# Patient Record
Sex: FEMALE | Race: WHITE | NOT HISPANIC OR LATINO | Employment: UNEMPLOYED | URBAN - METROPOLITAN AREA
[De-identification: names, ages, dates, MRNs, and addresses within clinical notes are randomized per-mention and may not be internally consistent; named-entity substitution may affect disease eponyms.]

---

## 2023-10-09 LAB
CREATININE (EXTERNAL): 0.68 MG/DL (ref 0.52–1.04)
GFR ESTIMATED (EXTERNAL): >60 ML/MIN/1.73M2
GLUCOSE (EXTERNAL): 147 MG/DL (ref 74–100)
POTASSIUM (EXTERNAL): 3.7 MMOL/L (ref 3.4–5.1)

## 2023-10-23 ENCOUNTER — TRANSFERRED RECORDS (OUTPATIENT)
Dept: HEALTH INFORMATION MANAGEMENT | Facility: CLINIC | Age: 38
End: 2023-10-23

## 2023-11-28 ENCOUNTER — TRANSFERRED RECORDS (OUTPATIENT)
Dept: HEALTH INFORMATION MANAGEMENT | Facility: CLINIC | Age: 38
End: 2023-11-28

## 2023-11-30 ENCOUNTER — MEDICAL CORRESPONDENCE (OUTPATIENT)
Dept: HEALTH INFORMATION MANAGEMENT | Facility: CLINIC | Age: 38
End: 2023-11-30

## 2023-12-05 ENCOUNTER — PRE VISIT (OUTPATIENT)
Dept: ONCOLOGY | Facility: CLINIC | Age: 38
End: 2023-12-05
Payer: COMMERCIAL

## 2023-12-05 ENCOUNTER — MEDICAL CORRESPONDENCE (OUTPATIENT)
Dept: HEALTH INFORMATION MANAGEMENT | Facility: CLINIC | Age: 38
End: 2023-12-05
Payer: COMMERCIAL

## 2023-12-05 ENCOUNTER — TRANSCRIBE ORDERS (OUTPATIENT)
Dept: OTHER | Age: 38
End: 2023-12-05

## 2023-12-05 ENCOUNTER — PATIENT OUTREACH (OUTPATIENT)
Dept: ONCOLOGY | Facility: CLINIC | Age: 38
End: 2023-12-05
Payer: COMMERCIAL

## 2023-12-05 DIAGNOSIS — R19.00 RETROPERITONEAL MASS: Primary | ICD-10-CM

## 2023-12-05 NOTE — TELEPHONE ENCOUNTER
"RECORDS STATUS - ALL OTHER DIAGNOSIS      RECORDS RECEIVED FROM: [facility]    Appt Date: TBD NN WQ    Action Taken  Date/Description  SANDHYA     N Navigator December 7, 2023  11:39 AM   Call to Pt to follow-up on records at 's.  She answered the phone by saying \"STOP F'ING CALLING ME\"  and hung up.  Marking complete - Message to NN    1:52 PM  Call from Evangelina to jazmyne request records for Pt    December 11, 2023 2:43 PM  VM from Pt's mom Lidia, stating she is the guardian and all calls should go to her and not the Pt.  I updated her to the primary number as she was listed secondary.     Action December 8, 2023 3:12 PM SANDHYA   Incoming Records Records received from [facility] and sent to [facility] for upload. Emailed to NN Team     Imaging Received  December 8, 2023  3:18 PM  SANDHYA   Action: Images from [facility] received and resolved to PACS.     Records Requested     Clinic name Comments/Action Taken   [facility] December 7, 2023 1:53 PM    Faxed request for all records   [facility] December 7, 2023 1:53 PM    Faxed request for all imaging     Action    Action Taken 12/5/2023 2:15pm KEB   I called pt Erlinda - I need her verbal consent to pull [facility]  records into CE. Her phone went to . I called again - unavailable.     12/6/2023 7:57AM KEB   I called pt Erlinda - unavailable. I left a vm requesting a call back.       NOTES STATUS DETAILS   OFFICE NOTE from referring provider Mark Arellano at [facility]    OFFICE NOTE from medical oncologist Left a vm for pt on 12/6/2023 around 8am - need verbal consent to pull her  records into CE.     DISCHARGE SUMMARY from hospital     DISCHARGE REPORT from the ER     OPERATIVE REPORT     MEDICATION LIST     CLINICAL TRIAL TREATMENTS TO DATE     LABS     PATHOLOGY REPORTS     ANYTHING RELATED TO DIAGNOSIS     GENONOMIC TESTING     TYPE:     IMAGING (NEED IMAGES & REPORT)     CT SCANS     MRI     MAMMO   "   ULTRASOUND     PET

## 2023-12-05 NOTE — PROGRESS NOTES
New Patient Hematology / Oncology Nurse Navigator Note     Referral Date: 12/5/23    Referring provider: Agus Arellano    Referring Clinic/Organization: Other - [facility]      Referred to: Surgical Oncology    Requested provider (if applicable): Dr. Cope    Evaluation for : retroperitoneal mass     Referral flagged for records needed and will have CSS team gather:    Imaging - pushed to [facility] and linked to patient chart  Clinic Notes - anything pertaining to this mass or with Dr Arellano  Labs - any labs done

## 2023-12-07 NOTE — PROGRESS NOTES
Writer placed call to referring clinic to advise that referral will be closed as patient has been nonresponsive to writer and requested to not be contacted again to the CSS member attempting to gather records. Writer had to leave Vm on surgical nurse line, contact information provided for further questions.

## 2023-12-07 NOTE — PROGRESS NOTES
Writer placed call to Erlinda to confirm that she is not interested in pursuing workup for lipoma/mass found on imaging, referral from Dr Arellano to Dr Cope. Per encounter from CSS team when attempting to gather records, she answered the phone and asked to not be contacted anymore. Writer placed call to ensure she is not interested but after she answered, she became nonresponsive to attempts to confirm her identity. Writer could hear active noise in the background but she would not engage in conversation. Call ended.

## 2023-12-08 ENCOUNTER — TRANSCRIBE ORDERS (OUTPATIENT)
Dept: OTHER | Age: 38
End: 2023-12-08

## 2023-12-08 DIAGNOSIS — K31.89 MASS OF DUODENUM: Primary | ICD-10-CM

## 2024-01-05 ENCOUNTER — PATIENT OUTREACH (OUTPATIENT)
Dept: ONCOLOGY | Facility: CLINIC | Age: 39
End: 2024-01-05

## 2024-01-05 ENCOUNTER — ONCOLOGY VISIT (OUTPATIENT)
Dept: ONCOLOGY | Facility: CLINIC | Age: 39
End: 2024-01-05
Attending: SURGERY
Payer: COMMERCIAL

## 2024-01-05 VITALS
DIASTOLIC BLOOD PRESSURE: 78 MMHG | TEMPERATURE: 97.7 F | OXYGEN SATURATION: 96 % | RESPIRATION RATE: 16 BRPM | HEART RATE: 89 BPM | BODY MASS INDEX: 30.54 KG/M2 | WEIGHT: 151.5 LBS | HEIGHT: 59 IN | SYSTOLIC BLOOD PRESSURE: 120 MMHG

## 2024-01-05 DIAGNOSIS — R19.00 RETROPERITONEAL MASS: Primary | ICD-10-CM

## 2024-01-05 PROCEDURE — G0463 HOSPITAL OUTPT CLINIC VISIT: HCPCS | Performed by: SURGERY

## 2024-01-05 PROCEDURE — 99203 OFFICE O/P NEW LOW 30 MIN: CPT | Performed by: SURGERY

## 2024-01-05 RX ORDER — POTASSIUM CHLORIDE 1500 MG/1
20 TABLET, EXTENDED RELEASE ORAL 2 TIMES DAILY
COMMUNITY
Start: 2022-06-27

## 2024-01-05 RX ORDER — FERROUS SULFATE 325(65) MG
1 TABLET ORAL DAILY
COMMUNITY
Start: 2022-06-06

## 2024-01-05 RX ORDER — SERTRALINE HYDROCHLORIDE 25 MG/1
1 TABLET, FILM COATED ORAL EVERY EVENING
COMMUNITY
Start: 2023-10-27

## 2024-01-05 RX ORDER — FOLIC ACID 1 MG/1
1 TABLET ORAL DAILY
COMMUNITY
Start: 2023-10-27

## 2024-01-05 RX ORDER — FUROSEMIDE 20 MG
1 TABLET ORAL DAILY
Status: ON HOLD | COMMUNITY
End: 2024-02-14

## 2024-01-05 RX ORDER — LEVETIRACETAM 500 MG/1
4 TABLET ORAL 2 TIMES DAILY
COMMUNITY
Start: 2022-06-21

## 2024-01-05 RX ORDER — CHOLECALCIFEROL (VITAMIN D3) 50 MCG
1 TABLET ORAL DAILY
COMMUNITY
Start: 2023-10-27

## 2024-01-05 RX ORDER — CLINDAMYCIN PHOSPHATE 11.9 MG/ML
5 SOLUTION TOPICAL PRN
COMMUNITY
Start: 2022-10-05

## 2024-01-05 RX ORDER — LACOSAMIDE 100 MG/1
100 TABLET ORAL 2 TIMES DAILY
COMMUNITY
Start: 2023-11-30

## 2024-01-05 RX ORDER — METOPROLOL TARTRATE 25 MG/1
1 TABLET, FILM COATED ORAL 2 TIMES DAILY
COMMUNITY
Start: 2022-06-21

## 2024-01-05 RX ORDER — SODIUM CHLORIDE 1 G/1
1 TABLET ORAL 2 TIMES DAILY
COMMUNITY
Start: 2023-06-15

## 2024-01-05 RX ORDER — TORSEMIDE 5 MG/1
1 TABLET ORAL 2 TIMES DAILY
COMMUNITY
Start: 2023-09-19

## 2024-01-05 RX ORDER — CETIRIZINE HYDROCHLORIDE 10 MG/1
1 TABLET ORAL EVERY EVENING
COMMUNITY
Start: 2023-10-27

## 2024-01-05 RX ORDER — CLONAZEPAM 0.5 MG/1
0.5 TABLET ORAL 2 TIMES DAILY PRN
COMMUNITY
Start: 2022-05-18

## 2024-01-05 ASSESSMENT — PAIN SCALES - GENERAL: PAINLEVEL: NO PAIN (0)

## 2024-01-05 NOTE — NURSING NOTE
"Oncology Rooming Note    January 5, 2024 11:00 AM   Erlinda Reyes is a 38 year old female who presents for:    Chief Complaint   Patient presents with    Oncology Clinic Visit     Retroperitoneal mass     Initial Vitals: /78 (BP Location: Left arm, Patient Position: Sitting, Cuff Size: Adult Regular)   Pulse 89   Temp 97.7  F (36.5  C) (Oral)   Resp 16   Ht 1.505 m (4' 11.25\")   Wt 68.7 kg (151 lb 8 oz)   LMP  (LMP Unknown)   SpO2 96%   BMI 30.34 kg/m   Estimated body mass index is 30.34 kg/m  as calculated from the following:    Height as of this encounter: 1.505 m (4' 11.25\").    Weight as of this encounter: 68.7 kg (151 lb 8 oz). Body surface area is 1.69 meters squared.  No Pain (0) Comment: Data Unavailable   No LMP recorded (lmp unknown).  Allergies reviewed: Yes  Medications reviewed: Yes    Medications: Medication refills not needed today.  Pharmacy name entered into [facility]: [facility] - [place], [place] - [address] [place] [address] [address]    Frailty Screening:   Is the patient here for a new oncology consult visit in cancer care? 2. No      Clinical concerns: none       Colette Goldstein              "

## 2024-01-05 NOTE — PROGRESS NOTES
Patient is a 38-year-old woman with a new mass in her retroperitoneum.  She recently had a CT scan in September for flank pain.  A new lipomatous mass was identified in the retroperitoneum near the duodenum the mass measured approximately 2.6 cm in size and was well defined.  She had a CT scan on January 16, 2022 and I do not see the mass at that time.  She is asymptomatic from this mass.  Her past medical history significant for brain tumor that was removed when she was 2.  She has SIADH.  She has had a history of a cholecystectomy and foot surgery.    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  However a well-differentiated liposarcoma cannot be excluded.  Furthermore cannot be excluded with a needle biopsy.  The patient her mother would like to have this removed.  I talked about the approach.  I would do this is an open procedure.  She would likely be in the hospital for a day or 2 after surgery.  We would test the tumor to make sure she does not this is not malignant.  All of her questions were asked and answered.    Total time was 30 minutes which included reviewing her imaging in person visit and coordinating her care

## 2024-01-05 NOTE — LETTER
1/5/2024         RE: Erlinda Reyes  [address] [facility] [address]        Dear Colleague,    Thank you for referring your patient, Erlinda Reyes, to the [facility]. Please see a copy of my visit note below.    Patient is a 38-year-old woman with a new mass in her retroperitoneum.  She recently had a CT scan in September for flank pain.  A new lipomatous mass was identified in the retroperitoneum near the duodenum the mass measured approximately 2.6 cm in size and was well defined.  She had a CT scan on January 16, 2022 and I do not see the mass at that time.  She is asymptomatic from this mass.  Her past medical history significant for brain tumor that was removed when she was 2.  She has SIADH.  She has had a history of a cholecystectomy and foot surgery.    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  However a well-differentiated liposarcoma cannot be excluded.  Furthermore cannot be excluded with a needle biopsy.  The patient her mother would like to have this removed.  I talked about the approach.  I would do this is an open procedure.  She would likely be in the hospital for a day or 2 after surgery.  We would test the tumor to make sure she does not this is not malignant.  All of her questions were asked and answered.    Total time was 30 minutes which included reviewing her imaging in person visit and coordinating her care      Sincerely,        Agus Cope MD

## 2024-01-08 ENCOUNTER — TELEPHONE (OUTPATIENT)
Dept: ONCOLOGY | Facility: CLINIC | Age: 39
End: 2024-01-08
Payer: COMMERCIAL

## 2024-01-08 NOTE — TELEPHONE ENCOUNTER
Called and spoke with the patient to schedule surgery. Surgery scheduled for 2/14 at [facility] with Dr. Cope. Offered earlier date but patient is coming down with a cold and they want to delay to give time to recover from cold.    H&P with PCP Dr. Perez within 30 days of the surgery date. Patient verbalized understanding H&P is needed prior to surgery.    Post-op scheduled for 3/8 with Dr. Cope in [place].    Writer will mail surgery packet.    No further questions/concerns at this time.    Marianne Garcia on 1/8/2024 at 9:19 AM

## 2024-02-12 ENCOUNTER — ANESTHESIA EVENT (OUTPATIENT)
Dept: SURGERY | Facility: CLINIC | Age: 39
DRG: 336 | End: 2024-02-12
Payer: COMMERCIAL

## 2024-02-12 ENCOUNTER — DOCUMENTATION ONLY (OUTPATIENT)
Dept: OTHER | Facility: CLINIC | Age: 39
End: 2024-02-12
Payer: COMMERCIAL

## 2024-02-12 NOTE — OR NURSING
Surgery 2/14. Platelet count 86. SIADH.  Received: Today  Mackenzie Barbosa, Agus Doan MD; [facility]; Genoveva Wells MD  Cc: Tiffany Gustafson RN  Hello,    Hilario is scheduled for retroperitoneal tumor removal on 2/14/24    Her pre-op H&P notes she has history of Increased risk of metabolic disturbance due to SIADH.  H/O difficult airway, IV access challenging due to SVC syndrome, static neuro deficit (R hemiplegia from prior meningioma excision).    Pt's Platelet count was 86 on 2/8/24.    Kind regards,    Mackenzie Barbosa RN, BSN  Pre-Anesthesia Screening  [phone] Office

## 2024-02-13 NOTE — OR NURSING
"RE: Surgery 2/14. Platelet count 86. SIADH.  Received: Today  Jay Valenzuela MD Sun, Lisa Yang, MD; Mackenzie Barbosa, RN; Agus Cope MD; [facility]  Cc: Tiffany Gustafson, RN  Thanks Genoveva!          Previous Messages       ----- Message -----  From: Genoveva Wells MD  Sent: 2/12/2024   4:45 PM CST  To: Agus Cope MD; Mackenzie Barbosa RN; *  Subject: RE: Surgery 2/14. Platelet count 86. SIADH.      Thanks, Mackenzie. I'm including Dr. Valenzuela, who is AIC on 2/14.    Jay, this is what I can see in the chart with regards to difficult airway history, from 2021 airway note: \"Easy mask Ventilation. Patient has small mouth opening <2 FB. Glidescope intubation selected. Glottis visualized upon glidescope insertion with small glottic opening at an anterior angle. Glidescope stylet attempted and failed to pass through vocal cords. Bougie utilized to successfully intubate trachea followed by atraumatic insertion of ETT over bougie. Would recommend glidescope and bougie for further intubation attempts\"      ----- Message -----  From: Mackenzie Barbosa RN  Sent: 2/12/2024   4:29 PM CST  To: Agus Cope MD; Tiffany Gustafson RN; *  Subject: Surgery 2/14. Platelet count 86. SIADH.          Hello,    Pt is scheduled for retroperitoneal tumor removal on 2/14/24    Her pre-op H&P notes she has history of Increased risk of metabolic disturbance due to SIADH.  H/O difficult airway, IV access challenging due to SVC syndrome, static neuro deficit (R hemiplegia from prior meningioma excision).    Pt's Platelet count was 86 on 2/8/24.    Kind regards,    Mackenzie Barbosa RN, BSN  Pre-Anesthesia Screening  [phone] Office        "

## 2024-02-13 NOTE — ANESTHESIA PREPROCEDURE EVALUATION
Anesthesia Pre-Procedure Evaluation    Patient: Erlinda Reyes   MRN: [id number] : 1985        Procedure : Procedure(s):  retroperitoneal tumor removal          Past Medical History:   Diagnosis Date    Difficult intubation       History reviewed. No pertinent surgical history.   Allergies   Allergen Reactions    Adhesive Tape Rash    Cefaclor Rash     Reaction Date: 2008; Annotation - [id number]: CECLOR          Reaction Date: 2008; Annotation - [id number]: CECLOR    Erythromycin Other (See Comments), Rash and Unknown      Social History     Tobacco Use    Smoking status: Never    Smokeless tobacco: Never   Substance Use Topics    Alcohol use: Not Currently      Wt Readings from Last 1 Encounters:   24 67.4 kg (148 lb 9.4 oz)        Anesthesia Evaluation   Pt has had prior anesthetic.     History of anesthetic complications  - difficult airway.      ROS/MED HX  ENT/Pulmonary:  - neg pulmonary ROS     Neurologic: Comment: SIADH, static neuro deficit (R hemiplegia from prior meningioma excision).  Meningioma  Epilepsy on Keppra      Cardiovascular: Comment: SVC syndrome    (+)  hypertension- -   -  - -                                 Previous cardiac testing   Echo: Date: 2013 Results:  Final Impression:   1. Normal estimated left ventricular ejection fraction is 55-60% with   normal wall motion.   2. Sinus rhythm.   3. Right ventricular contraction is normal.   4. Mitral insufficiency trace.   5. Tricuspid insufficiency mild. Estimated PA pressure normal at 26 plus   right atrial pressure.     Stress Test:  Date: Results:    ECG Reviewed:  Date: Results:    Cath:  Date: Results:      METS/Exercise Tolerance:     Hematologic: Comments: Thrombocytopenia 114      Musculoskeletal:  - neg musculoskeletal ROS     GI/Hepatic: Comment: peritoneal tumor - which appears to be a lipoma    (+) GERD,                   Renal/Genitourinary:  - neg Renal ROS     Endo:  - neg endo ROS    "  Psychiatric/Substance Use:     (+) psychiatric history anxiety       Infectious Disease:  - neg infectious disease ROS     Malignancy:   (+) Malignancy,     Other:  - neg other ROS          Physical Exam    Airway        Mallampati: IV   TM distance: > 3 FB   Neck ROM: full   Mouth opening: < 3 cm    Respiratory Devices and Support         Dental       (+) Minor Abnormalities - some fillings, tiny chips      Cardiovascular   cardiovascular exam normal          Pulmonary   pulmonary exam normal                OUTSIDE LABS:  CBC: No results found for: \"WBC\", \"HGB\", \"HCT\", \"PLT\"  BMP:   Lab Results   Component Value Date    [facility] 91 02/14/2024     COAGS: No results found for: \"PTT\", \"INR\", \"FIBR\"  POC: No results found for: \"BGM\", \"HCG\", \"HCGS\"  HEPATIC: No results found for: \"ALBUMIN\", \"PROTTOTAL\", \"ALT\", \"AST\", \"GGT\", \"ALKPHOS\", \"BILITOTAL\", \"BILIDIRECT\", \"NISSA\"  OTHER: No results found for: \"PH\", \"LACT\", \"A1C\", \"TOMMY\", \"PHOS\", \"MAG\", \"LIPASE\", \"AMYLASE\", \"TSH\", \"T4\", \"T3\", \"CRP\", \"SED\"    Anesthesia Plan    ASA Status:  3    NPO Status:  NPO Appropriate    Anesthesia Type: General.     - Airway: ETT   Induction: Propofol, Intravenous.   Maintenance: Balanced.   Techniques and Equipment:     - Airway: Shoulder Mohsen/Ramp, Fiberoptic Bronchoscope, Video-Laryngoscope     - Lines/Monitors: 2nd IV, BIS     Consents    Anesthesia Plan(s) and associated risks, benefits, and realistic alternatives discussed. Questions answered and patient/representative(s) expressed understanding.     - Discussed: Risks, Benefits and Alternatives for BOTH SEDATION and the PROCEDURE were discussed     - Discussed with:  Patient, Parent (Mother and/or Father)      - Extended Intubation/Ventilatory Support Discussed: Yes.      - Patient is DNR/DNI Status: No     Use of blood products discussed: Yes.     - Discussed with: Patient.     - Consented: consented to blood products     Postoperative Care    Pain management: IV analgesics, Oral pain " "medications, Multi-modal analgesia.   PONV prophylaxis: Ondansetron (or other 5HT-3), Dexamethasone or Solumedrol     Comments:               Axel Rivers MD    I have reviewed the pertinent notes and labs in the chart from the past 30 days and (re)examined the patient.  Any updates or changes from those notes are reflected in this note.              # Obesity: Estimated body mass index is 31.06 kg/m  as calculated from the following:    Height as of this encounter: 1.473 m (4' 10\").    Weight as of this encounter: 67.4 kg (148 lb 9.4 oz).      "

## 2024-02-14 ENCOUNTER — ANESTHESIA (OUTPATIENT)
Dept: SURGERY | Facility: CLINIC | Age: 39
DRG: 336 | End: 2024-02-14
Payer: COMMERCIAL

## 2024-02-14 ENCOUNTER — HOSPITAL ENCOUNTER (INPATIENT)
Facility: CLINIC | Age: 39
LOS: 5 days | Discharge: HOME | DRG: 336 | End: 2024-02-19
Attending: SURGERY | Admitting: SURGERY
Payer: COMMERCIAL

## 2024-02-14 DIAGNOSIS — G89.18 POSTOPERATIVE PAIN: Primary | ICD-10-CM

## 2024-02-14 LAB
ANION GAP SERPL CALCULATED.3IONS-SCNC: 11 MMOL/L (ref 7–15)
ANION GAP SERPL CALCULATED.3IONS-SCNC: 12 MMOL/L (ref 7–15)
BUN SERPL-MCNC: 14.5 MG/DL (ref 6–20)
BUN SERPL-MCNC: 14.8 MG/DL (ref 6–20)
CALCIUM SERPL-MCNC: 7.9 MG/DL (ref 8.6–10)
CALCIUM SERPL-MCNC: 8.1 MG/DL (ref 8.6–10)
CHLORIDE SERPL-SCNC: 104 MMOL/L (ref 98–107)
CHLORIDE SERPL-SCNC: 106 MMOL/L (ref 98–107)
CREAT SERPL-MCNC: 0.6 MG/DL (ref 0.51–0.95)
CREAT SERPL-MCNC: 0.66 MG/DL (ref 0.51–0.95)
DEPRECATED HCO3 PLAS-SCNC: 21 MMOL/L (ref 22–29)
DEPRECATED HCO3 PLAS-SCNC: 23 MMOL/L (ref 22–29)
EGFRCR SERPLBLD CKD-EPI 2021: >90 ML/MIN/1.73M2
EGFRCR SERPLBLD CKD-EPI 2021: >90 ML/MIN/1.73M2
ERYTHROCYTE [DISTWIDTH] IN BLOOD BY AUTOMATED COUNT: 12.3 % (ref 10–15)
GLUCOSE BLDC GLUCOMTR-MCNC: 91 MG/DL (ref 70–99)
GLUCOSE SERPL-MCNC: 125 MG/DL (ref 70–99)
GLUCOSE SERPL-MCNC: 127 MG/DL (ref 70–99)
HCT VFR BLD AUTO: 34 % (ref 35–47)
HGB BLD-MCNC: 10.9 G/DL (ref 11.7–15.7)
MAGNESIUM SERPL-MCNC: 1.9 MG/DL (ref 1.7–2.3)
MCH RBC QN AUTO: 29.8 PG (ref 26.5–33)
MCHC RBC AUTO-ENTMCNC: 32.1 G/DL (ref 31.5–36.5)
MCV RBC AUTO: 93 FL (ref 78–100)
PHOSPHATE SERPL-MCNC: 2.4 MG/DL (ref 2.5–4.5)
PLATELET # BLD AUTO: 110 10E3/UL (ref 150–450)
POTASSIUM SERPL-SCNC: 4.1 MMOL/L (ref 3.4–5.3)
POTASSIUM SERPL-SCNC: 4.8 MMOL/L (ref 3.4–5.3)
RBC # BLD AUTO: 3.66 10E6/UL (ref 3.8–5.2)
SODIUM SERPL-SCNC: 138 MMOL/L (ref 135–145)
SODIUM SERPL-SCNC: 139 MMOL/L (ref 135–145)
WBC # BLD AUTO: 10 10E3/UL (ref 4–11)

## 2024-02-14 PROCEDURE — 88305 TISSUE EXAM BY PATHOLOGIST: CPT | Mod: 26 | Performed by: PATHOLOGY

## 2024-02-14 PROCEDURE — 250N000011 HC RX IP 250 OP 636: Performed by: STUDENT IN AN ORGANIZED HEALTH CARE EDUCATION/TRAINING PROGRAM

## 2024-02-14 PROCEDURE — 0DN90ZZ RELEASE DUODENUM, OPEN APPROACH: ICD-10-PCS | Performed by: SURGERY

## 2024-02-14 PROCEDURE — 88368 INSITU HYBRIDIZATION MANUAL: CPT | Mod: 26 | Performed by: MEDICAL GENETICS

## 2024-02-14 PROCEDURE — 88341 IMHCHEM/IMCYTCHM EA ADD ANTB: CPT | Mod: 26 | Performed by: PATHOLOGY

## 2024-02-14 PROCEDURE — 49203 PR EXCISION/DESTRUCTION OPEN ABDOMINAL TUMORS 5 CM: CPT | Mod: GC | Performed by: SURGERY

## 2024-02-14 PROCEDURE — 36415 COLL VENOUS BLD VENIPUNCTURE: CPT | Performed by: STUDENT IN AN ORGANIZED HEALTH CARE EDUCATION/TRAINING PROGRAM

## 2024-02-14 PROCEDURE — 250N000025 HC SEVOFLURANE, PER MIN: Performed by: SURGERY

## 2024-02-14 PROCEDURE — 84100 ASSAY OF PHOSPHORUS: CPT | Performed by: SURGERY

## 2024-02-14 PROCEDURE — 83735 ASSAY OF MAGNESIUM: CPT | Performed by: SURGERY

## 2024-02-14 PROCEDURE — 370N000017 HC ANESTHESIA TECHNICAL FEE, PER MIN: Performed by: SURGERY

## 2024-02-14 PROCEDURE — 80048 BASIC METABOLIC PNL TOTAL CA: CPT | Performed by: STUDENT IN AN ORGANIZED HEALTH CARE EDUCATION/TRAINING PROGRAM

## 2024-02-14 PROCEDURE — 88271 CYTOGENETICS DNA PROBE: CPT | Performed by: SURGERY

## 2024-02-14 PROCEDURE — 250N000013 HC RX MED GY IP 250 OP 250 PS 637: Performed by: STUDENT IN AN ORGANIZED HEALTH CARE EDUCATION/TRAINING PROGRAM

## 2024-02-14 PROCEDURE — 85027 COMPLETE CBC AUTOMATED: CPT | Performed by: SURGERY

## 2024-02-14 PROCEDURE — 88307 TISSUE EXAM BY PATHOLOGIST: CPT | Mod: 26 | Performed by: PATHOLOGY

## 2024-02-14 PROCEDURE — 80048 BASIC METABOLIC PNL TOTAL CA: CPT | Performed by: SURGERY

## 2024-02-14 PROCEDURE — 250N000009 HC RX 250: Performed by: STUDENT IN AN ORGANIZED HEALTH CARE EDUCATION/TRAINING PROGRAM

## 2024-02-14 PROCEDURE — 360N000077 HC SURGERY LEVEL 4, PER MIN: Performed by: SURGERY

## 2024-02-14 PROCEDURE — 272N000001 HC OR GENERAL SUPPLY STERILE: Performed by: SURGERY

## 2024-02-14 PROCEDURE — 258N000003 HC RX IP 258 OP 636: Performed by: STUDENT IN AN ORGANIZED HEALTH CARE EDUCATION/TRAINING PROGRAM

## 2024-02-14 PROCEDURE — 36415 COLL VENOUS BLD VENIPUNCTURE: CPT | Performed by: SURGERY

## 2024-02-14 PROCEDURE — 710N000010 HC RECOVERY PHASE 1, LEVEL 2, PER MIN: Performed by: SURGERY

## 2024-02-14 PROCEDURE — 88342 IMHCHEM/IMCYTCHM 1ST ANTB: CPT | Mod: 26 | Performed by: PATHOLOGY

## 2024-02-14 PROCEDURE — 88342 IMHCHEM/IMCYTCHM 1ST ANTB: CPT | Mod: TC | Performed by: SURGERY

## 2024-02-14 PROCEDURE — 120N000002 HC R&B MED SURG/OB UMMC

## 2024-02-14 PROCEDURE — 999N000141 HC STATISTIC PRE-PROCEDURE NURSING ASSESSMENT: Performed by: SURGERY

## 2024-02-14 PROCEDURE — 250N000011 HC RX IP 250 OP 636: Performed by: SURGERY

## 2024-02-14 PROCEDURE — 0WBH0ZX EXCISION OF RETROPERITONEUM, OPEN APPROACH, DIAGNOSTIC: ICD-10-PCS | Performed by: SURGERY

## 2024-02-14 PROCEDURE — 88275 CYTOGENETICS 100-300: CPT | Performed by: SURGERY

## 2024-02-14 RX ORDER — CLONAZEPAM 0.5 MG/1
0.5 TABLET ORAL AT BEDTIME
Status: DISCONTINUED | OUTPATIENT
Start: 2024-02-14 | End: 2024-02-19 | Stop reason: HOSPADM

## 2024-02-14 RX ORDER — FENTANYL CITRATE 50 UG/ML
25 INJECTION, SOLUTION INTRAMUSCULAR; INTRAVENOUS EVERY 5 MIN PRN
Status: DISCONTINUED | OUTPATIENT
Start: 2024-02-14 | End: 2024-02-14 | Stop reason: HOSPADM

## 2024-02-14 RX ORDER — ENOXAPARIN SODIUM 100 MG/ML
40 INJECTION SUBCUTANEOUS EVERY 24 HOURS
Status: DISCONTINUED | OUTPATIENT
Start: 2024-02-15 | End: 2024-02-15

## 2024-02-14 RX ORDER — ONDANSETRON 4 MG/1
4 TABLET, ORALLY DISINTEGRATING ORAL EVERY 6 HOURS PRN
Status: DISCONTINUED | OUTPATIENT
Start: 2024-02-14 | End: 2024-02-19 | Stop reason: HOSPADM

## 2024-02-14 RX ORDER — SODIUM CHLORIDE, SODIUM LACTATE, POTASSIUM CHLORIDE, CALCIUM CHLORIDE 600; 310; 30; 20 MG/100ML; MG/100ML; MG/100ML; MG/100ML
INJECTION, SOLUTION INTRAVENOUS CONTINUOUS
Status: DISCONTINUED | OUTPATIENT
Start: 2024-02-14 | End: 2024-02-14 | Stop reason: HOSPADM

## 2024-02-14 RX ORDER — LIDOCAINE HYDROCHLORIDE 20 MG/ML
INJECTION, SOLUTION INFILTRATION; PERINEURAL PRN
Status: DISCONTINUED | OUTPATIENT
Start: 2024-02-14 | End: 2024-02-14

## 2024-02-14 RX ORDER — OXYCODONE HYDROCHLORIDE 5 MG/1
5 TABLET ORAL
Status: DISCONTINUED | OUTPATIENT
Start: 2024-02-14 | End: 2024-02-15

## 2024-02-14 RX ORDER — OXYCODONE HYDROCHLORIDE 10 MG/1
10 TABLET ORAL
Status: DISCONTINUED | OUTPATIENT
Start: 2024-02-14 | End: 2024-02-15

## 2024-02-14 RX ORDER — NALOXONE HYDROCHLORIDE 0.4 MG/ML
0.4 INJECTION, SOLUTION INTRAMUSCULAR; INTRAVENOUS; SUBCUTANEOUS
Status: DISCONTINUED | OUTPATIENT
Start: 2024-02-14 | End: 2024-02-19 | Stop reason: HOSPADM

## 2024-02-14 RX ORDER — SODIUM CHLORIDE, SODIUM LACTATE, POTASSIUM CHLORIDE, CALCIUM CHLORIDE 600; 310; 30; 20 MG/100ML; MG/100ML; MG/100ML; MG/100ML
INJECTION, SOLUTION INTRAVENOUS CONTINUOUS PRN
Status: DISCONTINUED | OUTPATIENT
Start: 2024-02-14 | End: 2024-02-14

## 2024-02-14 RX ORDER — LACOSAMIDE 50 MG/1
100 TABLET ORAL 2 TIMES DAILY
Status: DISCONTINUED | OUTPATIENT
Start: 2024-02-14 | End: 2024-02-19 | Stop reason: HOSPADM

## 2024-02-14 RX ORDER — FENTANYL CITRATE 50 UG/ML
50 INJECTION, SOLUTION INTRAMUSCULAR; INTRAVENOUS EVERY 5 MIN PRN
Status: DISCONTINUED | OUTPATIENT
Start: 2024-02-14 | End: 2024-02-14 | Stop reason: HOSPADM

## 2024-02-14 RX ORDER — LIDOCAINE 40 MG/G
CREAM TOPICAL
Status: DISCONTINUED | OUTPATIENT
Start: 2024-02-14 | End: 2024-02-14 | Stop reason: HOSPADM

## 2024-02-14 RX ORDER — GLYCOPYRROLATE 0.2 MG/ML
INJECTION, SOLUTION INTRAMUSCULAR; INTRAVENOUS PRN
Status: DISCONTINUED | OUTPATIENT
Start: 2024-02-14 | End: 2024-02-14

## 2024-02-14 RX ORDER — ACETAMINOPHEN 325 MG/1
975 TABLET ORAL ONCE
Status: COMPLETED | OUTPATIENT
Start: 2024-02-14 | End: 2024-02-14

## 2024-02-14 RX ORDER — LABETALOL HYDROCHLORIDE 5 MG/ML
10 INJECTION, SOLUTION INTRAVENOUS
Status: DISCONTINUED | OUTPATIENT
Start: 2024-02-14 | End: 2024-02-14 | Stop reason: HOSPADM

## 2024-02-14 RX ORDER — KETOROLAC TROMETHAMINE 30 MG/ML
INJECTION, SOLUTION INTRAMUSCULAR; INTRAVENOUS PRN
Status: DISCONTINUED | OUTPATIENT
Start: 2024-02-14 | End: 2024-02-14

## 2024-02-14 RX ORDER — NALOXONE HYDROCHLORIDE 0.4 MG/ML
0.2 INJECTION, SOLUTION INTRAMUSCULAR; INTRAVENOUS; SUBCUTANEOUS
Status: DISCONTINUED | OUTPATIENT
Start: 2024-02-14 | End: 2024-02-19 | Stop reason: HOSPADM

## 2024-02-14 RX ORDER — CEFAZOLIN SODIUM/WATER 2 G/20 ML
2 SYRINGE (ML) INTRAVENOUS SEE ADMIN INSTRUCTIONS
Status: DISCONTINUED | OUTPATIENT
Start: 2024-02-14 | End: 2024-02-14 | Stop reason: HOSPADM

## 2024-02-14 RX ORDER — PROPOFOL 10 MG/ML
INJECTION, EMULSION INTRAVENOUS PRN
Status: DISCONTINUED | OUTPATIENT
Start: 2024-02-14 | End: 2024-02-14

## 2024-02-14 RX ORDER — ENOXAPARIN SODIUM 100 MG/ML
40 INJECTION SUBCUTANEOUS ONCE
Status: COMPLETED | OUTPATIENT
Start: 2024-02-14 | End: 2024-02-14

## 2024-02-14 RX ORDER — SERTRALINE HYDROCHLORIDE 25 MG/1
25 TABLET, FILM COATED ORAL AT BEDTIME
Status: DISCONTINUED | OUTPATIENT
Start: 2024-02-14 | End: 2024-02-19 | Stop reason: HOSPADM

## 2024-02-14 RX ORDER — ONDANSETRON 4 MG/1
4 TABLET, ORALLY DISINTEGRATING ORAL EVERY 30 MIN PRN
Status: DISCONTINUED | OUTPATIENT
Start: 2024-02-14 | End: 2024-02-14 | Stop reason: HOSPADM

## 2024-02-14 RX ORDER — ONDANSETRON 4 MG/1
4 TABLET, ORALLY DISINTEGRATING ORAL EVERY 30 MIN PRN
Status: DISCONTINUED | OUTPATIENT
Start: 2024-02-14 | End: 2024-02-19 | Stop reason: HOSPADM

## 2024-02-14 RX ORDER — LEVETIRACETAM 500 MG/1
2000 TABLET ORAL 2 TIMES DAILY
Status: DISCONTINUED | OUTPATIENT
Start: 2024-02-14 | End: 2024-02-19 | Stop reason: HOSPADM

## 2024-02-14 RX ORDER — HYDROMORPHONE HCL IN WATER/PF 6 MG/30 ML
0.4 PATIENT CONTROLLED ANALGESIA SYRINGE INTRAVENOUS EVERY 5 MIN PRN
Status: DISCONTINUED | OUTPATIENT
Start: 2024-02-14 | End: 2024-02-14 | Stop reason: HOSPADM

## 2024-02-14 RX ORDER — ONDANSETRON 2 MG/ML
4 INJECTION INTRAMUSCULAR; INTRAVENOUS EVERY 30 MIN PRN
Status: DISCONTINUED | OUTPATIENT
Start: 2024-02-14 | End: 2024-02-14 | Stop reason: HOSPADM

## 2024-02-14 RX ORDER — SODIUM CHLORIDE 9 MG/ML
INJECTION, SOLUTION INTRAVENOUS CONTINUOUS
Status: DISCONTINUED | OUTPATIENT
Start: 2024-02-14 | End: 2024-02-18

## 2024-02-14 RX ORDER — SODIUM CHLORIDE 1 G/1
1 TABLET ORAL 2 TIMES DAILY
Status: DISCONTINUED | OUTPATIENT
Start: 2024-02-14 | End: 2024-02-19 | Stop reason: HOSPADM

## 2024-02-14 RX ORDER — ONDANSETRON 2 MG/ML
4 INJECTION INTRAMUSCULAR; INTRAVENOUS EVERY 6 HOURS PRN
Status: DISCONTINUED | OUTPATIENT
Start: 2024-02-14 | End: 2024-02-19 | Stop reason: HOSPADM

## 2024-02-14 RX ORDER — BUPIVACAINE HYDROCHLORIDE 2.5 MG/ML
INJECTION, SOLUTION INFILTRATION; PERINEURAL PRN
Status: DISCONTINUED | OUTPATIENT
Start: 2024-02-14 | End: 2024-02-14 | Stop reason: HOSPADM

## 2024-02-14 RX ORDER — FENTANYL CITRATE 50 UG/ML
INJECTION, SOLUTION INTRAMUSCULAR; INTRAVENOUS PRN
Status: DISCONTINUED | OUTPATIENT
Start: 2024-02-14 | End: 2024-02-14

## 2024-02-14 RX ORDER — ONDANSETRON 2 MG/ML
4 INJECTION INTRAMUSCULAR; INTRAVENOUS EVERY 30 MIN PRN
Status: DISCONTINUED | OUTPATIENT
Start: 2024-02-14 | End: 2024-02-19 | Stop reason: HOSPADM

## 2024-02-14 RX ORDER — FUROSEMIDE 20 MG
20 TABLET ORAL DAILY
Status: DISCONTINUED | OUTPATIENT
Start: 2024-02-15 | End: 2024-02-14

## 2024-02-14 RX ORDER — TORSEMIDE 5 MG/1
5 TABLET ORAL 2 TIMES DAILY
Status: DISCONTINUED | OUTPATIENT
Start: 2024-02-14 | End: 2024-02-19 | Stop reason: HOSPADM

## 2024-02-14 RX ORDER — HYDROMORPHONE HCL IN WATER/PF 6 MG/30 ML
0.2 PATIENT CONTROLLED ANALGESIA SYRINGE INTRAVENOUS EVERY 5 MIN PRN
Status: DISCONTINUED | OUTPATIENT
Start: 2024-02-14 | End: 2024-02-14 | Stop reason: HOSPADM

## 2024-02-14 RX ORDER — PROCHLORPERAZINE MALEATE 5 MG
10 TABLET ORAL EVERY 6 HOURS PRN
Status: DISCONTINUED | OUTPATIENT
Start: 2024-02-14 | End: 2024-02-19 | Stop reason: HOSPADM

## 2024-02-14 RX ORDER — KETAMINE HYDROCHLORIDE 10 MG/ML
INJECTION INTRAMUSCULAR; INTRAVENOUS PRN
Status: DISCONTINUED | OUTPATIENT
Start: 2024-02-14 | End: 2024-02-14

## 2024-02-14 RX ORDER — CEFAZOLIN SODIUM/WATER 2 G/20 ML
2 SYRINGE (ML) INTRAVENOUS
Status: COMPLETED | OUTPATIENT
Start: 2024-02-14 | End: 2024-02-14

## 2024-02-14 RX ORDER — DEXAMETHASONE SODIUM PHOSPHATE 4 MG/ML
INJECTION, SOLUTION INTRA-ARTICULAR; INTRALESIONAL; INTRAMUSCULAR; INTRAVENOUS; SOFT TISSUE PRN
Status: DISCONTINUED | OUTPATIENT
Start: 2024-02-14 | End: 2024-02-14

## 2024-02-14 RX ORDER — METOPROLOL TARTRATE 25 MG/1
25 TABLET, FILM COATED ORAL 2 TIMES DAILY
Status: DISCONTINUED | OUTPATIENT
Start: 2024-02-14 | End: 2024-02-15

## 2024-02-14 RX ORDER — PROCHLORPERAZINE 25 MG
25 SUPPOSITORY, RECTAL RECTAL EVERY 12 HOURS PRN
Status: DISCONTINUED | OUTPATIENT
Start: 2024-02-14 | End: 2024-02-19 | Stop reason: HOSPADM

## 2024-02-14 RX ORDER — PANTOPRAZOLE SODIUM 40 MG/1
40 TABLET, DELAYED RELEASE ORAL
Status: DISCONTINUED | OUTPATIENT
Start: 2024-02-15 | End: 2024-02-19 | Stop reason: HOSPADM

## 2024-02-14 RX ORDER — LIDOCAINE 40 MG/G
CREAM TOPICAL
Status: DISCONTINUED | OUTPATIENT
Start: 2024-02-14 | End: 2024-02-19 | Stop reason: HOSPADM

## 2024-02-14 RX ADMIN — FENTANYL CITRATE 25 MCG: 50 INJECTION INTRAMUSCULAR; INTRAVENOUS at 09:58

## 2024-02-14 RX ADMIN — METOPROLOL TARTRATE 25 MG: 25 TABLET, FILM COATED ORAL at 20:20

## 2024-02-14 RX ADMIN — PHENYLEPHRINE HYDROCHLORIDE 100 MCG: 10 INJECTION INTRAVENOUS at 08:31

## 2024-02-14 RX ADMIN — FENTANYL CITRATE 25 MCG: 50 INJECTION, SOLUTION INTRAMUSCULAR; INTRAVENOUS at 10:41

## 2024-02-14 RX ADMIN — LIDOCAINE HYDROCHLORIDE 100 MG: 20 INJECTION, SOLUTION INFILTRATION; PERINEURAL at 07:43

## 2024-02-14 RX ADMIN — ENOXAPARIN SODIUM 40 MG: 40 INJECTION SUBCUTANEOUS at 07:56

## 2024-02-14 RX ADMIN — Medication 20 MG: at 07:49

## 2024-02-14 RX ADMIN — PHENYLEPHRINE HYDROCHLORIDE 100 MCG: 10 INJECTION INTRAVENOUS at 07:56

## 2024-02-14 RX ADMIN — DEXMEDETOMIDINE HYDROCHLORIDE 4 MCG: 100 INJECTION, SOLUTION INTRAVENOUS at 09:51

## 2024-02-14 RX ADMIN — SODIUM CHLORIDE, POTASSIUM CHLORIDE, SODIUM LACTATE AND CALCIUM CHLORIDE: 600; 310; 30; 20 INJECTION, SOLUTION INTRAVENOUS at 07:33

## 2024-02-14 RX ADMIN — ONDANSETRON 4 MG: 2 INJECTION INTRAMUSCULAR; INTRAVENOUS at 10:27

## 2024-02-14 RX ADMIN — SUCCINYLCHOLINE CHLORIDE 70 MG: 20 INJECTION, SOLUTION INTRAMUSCULAR; INTRAVENOUS; PARENTERAL at 07:45

## 2024-02-14 RX ADMIN — Medication 2 G: at 07:37

## 2024-02-14 RX ADMIN — MIDAZOLAM 2 MG: 1 INJECTION INTRAMUSCULAR; INTRAVENOUS at 07:31

## 2024-02-14 RX ADMIN — GLYCOPYRROLATE 0.2 MG: 0.2 INJECTION, SOLUTION INTRAMUSCULAR; INTRAVENOUS at 08:39

## 2024-02-14 RX ADMIN — DEXMEDETOMIDINE HYDROCHLORIDE 4 MCG: 100 INJECTION, SOLUTION INTRAVENOUS at 09:33

## 2024-02-14 RX ADMIN — ACETAMINOPHEN 975 MG: 325 TABLET, FILM COATED ORAL at 06:58

## 2024-02-14 RX ADMIN — DEXMEDETOMIDINE HYDROCHLORIDE 8 MCG: 100 INJECTION, SOLUTION INTRAVENOUS at 09:26

## 2024-02-14 RX ADMIN — FENTANYL CITRATE 25 MCG: 50 INJECTION, SOLUTION INTRAMUSCULAR; INTRAVENOUS at 10:27

## 2024-02-14 RX ADMIN — Medication 20 MG: at 08:33

## 2024-02-14 RX ADMIN — SODIUM CHLORIDE: 9 INJECTION, SOLUTION INTRAVENOUS at 22:21

## 2024-02-14 RX ADMIN — SUGAMMADEX 150 MG: 100 INJECTION, SOLUTION INTRAVENOUS at 09:59

## 2024-02-14 RX ADMIN — Medication 10 MG: at 09:25

## 2024-02-14 RX ADMIN — SODIUM CHLORIDE, SODIUM LACTATE, POTASSIUM CHLORIDE, CALCIUM CHLORIDE: 600; 310; 30; 20 INJECTION, SOLUTION INTRAVENOUS at 07:54

## 2024-02-14 RX ADMIN — DEXAMETHASONE SODIUM PHOSPHATE 4 MG: 4 INJECTION, SOLUTION INTRA-ARTICULAR; INTRALESIONAL; INTRAMUSCULAR; INTRAVENOUS; SOFT TISSUE at 07:52

## 2024-02-14 RX ADMIN — SODIUM CHLORIDE: 9 INJECTION, SOLUTION INTRAVENOUS at 11:00

## 2024-02-14 RX ADMIN — PROPOFOL 140 MG: 10 INJECTION, EMULSION INTRAVENOUS at 07:44

## 2024-02-14 RX ADMIN — PHENYLEPHRINE HYDROCHLORIDE 100 MCG: 10 INJECTION INTRAVENOUS at 09:07

## 2024-02-14 RX ADMIN — FENTANYL CITRATE 50 MCG: 50 INJECTION INTRAMUSCULAR; INTRAVENOUS at 07:50

## 2024-02-14 RX ADMIN — CLONAZEPAM 0.5 MG: 0.5 TABLET ORAL at 20:20

## 2024-02-14 RX ADMIN — HYDROMORPHONE HYDROCHLORIDE 0.3 MG: 1 INJECTION, SOLUTION INTRAMUSCULAR; INTRAVENOUS; SUBCUTANEOUS at 08:12

## 2024-02-14 RX ADMIN — LACOSAMIDE 100 MG: 50 TABLET, FILM COATED ORAL at 20:20

## 2024-02-14 RX ADMIN — HYDROMORPHONE HYDROCHLORIDE 0.2 MG: 1 INJECTION, SOLUTION INTRAMUSCULAR; INTRAVENOUS; SUBCUTANEOUS at 08:46

## 2024-02-14 RX ADMIN — KETOROLAC TROMETHAMINE 30 MG: 30 INJECTION, SOLUTION INTRAMUSCULAR at 09:50

## 2024-02-14 RX ADMIN — PHENYLEPHRINE HYDROCHLORIDE 100 MCG: 10 INJECTION INTRAVENOUS at 09:08

## 2024-02-14 RX ADMIN — SERTRALINE HYDROCHLORIDE 25 MG: 25 TABLET ORAL at 20:20

## 2024-02-14 RX ADMIN — LEVETIRACETAM 2000 MG: 500 TABLET, FILM COATED ORAL at 20:20

## 2024-02-14 RX ADMIN — PROCHLORPERAZINE EDISYLATE 5 MG: 5 INJECTION INTRAMUSCULAR; INTRAVENOUS at 11:00

## 2024-02-14 RX ADMIN — Medication 20 MG: at 08:19

## 2024-02-14 RX ADMIN — TORSEMIDE 5 MG: 5 TABLET ORAL at 20:20

## 2024-02-14 RX ADMIN — FENTANYL CITRATE 50 MCG: 50 INJECTION INTRAMUSCULAR; INTRAVENOUS at 08:06

## 2024-02-14 RX ADMIN — Medication: at 11:18

## 2024-02-14 RX ADMIN — PHENYLEPHRINE HYDROCHLORIDE 100 MCG: 10 INJECTION INTRAVENOUS at 09:29

## 2024-02-14 RX ADMIN — SODIUM CHLORIDE TAB 1 GM 1 G: 1 TAB at 20:20

## 2024-02-14 RX ADMIN — PHENYLEPHRINE HYDROCHLORIDE 200 MCG: 10 INJECTION INTRAVENOUS at 09:16

## 2024-02-14 ASSESSMENT — ACTIVITIES OF DAILY LIVING (ADL)
ADLS_ACUITY_SCORE: 23
ADLS_ACUITY_SCORE: 24
ADLS_ACUITY_SCORE: 21
ADLS_ACUITY_SCORE: 23
ADLS_ACUITY_SCORE: 24
ADLS_ACUITY_SCORE: 24
ADLS_ACUITY_SCORE: 23

## 2024-02-14 NOTE — ANESTHESIA PROCEDURE NOTES
Airway       Patient location during procedure: OR       Procedure Start/Stop Times: 2/14/2024 7:46 AM  Staff -        Anesthesiologist:  Vijay Ritchie MD       Resident/Fellow: Axel Ballard MD       Performed By: resident  Consent for Airway        Urgency: elective  Indications and Patient Condition       Indications for airway management: nita-procedural       Induction type:RSI       Mask difficulty assessment: 0 - not attempted    Final Airway Details       Final airway type: endotracheal airway       Successful airway: ETT - single and Oral  Endotracheal Airway Details        ETT size (mm): 7.0       Cuffed: yes       Successful intubation technique: video laryngoscopy and flexible bronchoscopy       VL Blade Size: Glidescope 4       Grade View of Cords: 1       Position: Right       Measured from: lips       Secured at (cm): 20       Bite block used: None    Post intubation assessment        Placement verified by: capnometry, equal breath sounds and chest rise        Number of attempts at approach: 1       Number of other approaches attempted: 0       Secured with: pink tape       Ease of procedure: easy       Dentition: Intact and Unchanged    Medication(s) Administered   Medication Administration Time: 2/14/2024 7:46 AM    Additional Comments       Patient with previous difficult airway history. Per chart review, easy mask with anterior airway with difficult intubating with a stylet. Per records, eventually able to intubate using a bougie.  We used a VL hyperangulated blade (Glidescope LoPro 3) with grade 1 view and intubated using fiberoptic.

## 2024-02-14 NOTE — PROGRESS NOTES
Admission          2/14/2024  5:46 AM  -----------------------------------------------------------  Reason for admission: post op for Exploratory Laparotomy, Retroperitoneal Tumor Removal   Primary team notified of pt arrival.  Admitted from: PACU  Via: stretcher  Accompanied by: aysha   Belongings: Placed in closet; valuables sent home with family  Admission Profile: in progress  Teaching: orientation to unit and call light- call light within reach, call don't fall, use of console, meal times, when to call for the RN, and enforced importance of safety   Access: PIV  Ht./Wt.: complete  Code Status verified on armband: yes  2 RN Skin Assessment Completed By:   Rah Rec completed: in progress  Suction/Ambu bag/Flowmeter at bedside: yes/no  Is patient having diarrhea upon admission- if YES fill out testing algorithm : no    C. Diff Testing Algorithm (MUST be marked YES)   3 or more loose stools in 24 hrs. [] Yes [x] No       Additional symptoms:(At least ONE must be marked yes)   Abdominal pain/discomfort [] Yes [x] No   Fever at least 38C (100.4 F) [] Yes [x] No   Elevated WBC(>11,000) [] Yes [x] No       Exclusion Criteria:  (MUST be marked YES)   Off laxatives for at least 48 hrs. [] Yes [x] No       Pt status:    Temp:  [97.7  F (36.5  C)-98.4  F (36.9  C)] 98.4  F (36.9  C)  Pulse:  [75-82] 80  Resp:  [10-20] 16  BP: (100-124)/(51-79) 104/63  SpO2:  [96 %-100 %] 96 %

## 2024-02-14 NOTE — BRIEF OP NOTE
[facility]    Brief Operative Note    Pre-operative diagnosis: Retroperitoneal mass [R19.00]  Post-operative diagnosis Same as pre-operative diagnosis    Procedure: Exploratory Laparotomy, Retroperitoneal Tumor Removal, N/A - Abdomen    Surgeon: Surgeon(s) and Role:     * Agus Cope MD - Primary     * Marianne Torres MD - Resident - Assisting  Anesthesia: General   Estimated Blood Loss: 100 ml    Drains: None  Specimens:   ID Type Source Tests Collected by Time Destination   1 : Retroperitoneal Tumor Tissue Retroperitoneal SURGICAL PATHOLOGY EXAM Agus Cope MD 2/14/2024  9:02 AM    2 : Lateral margin of retroperitoneal tumor Tissue Retroperitoneal SURGICAL PATHOLOGY EXAM Agus Cope MD 2/14/2024  9:04 AM      Findings:   Firm retroperitoneal mass within mesentery and densely adherent to duodenum .  Complications: None.  Implants: * No implants in log *    Marianne Torres MD  Surgery PGY-5

## 2024-02-14 NOTE — OP NOTE
Preoperative Diagnosis: Retroperitoneal mass    Postoperative Diagnosis: Same    Procedure: Exploratory laparotomy and removal of 2 cm retroperitoneal mass.    Surgeons: Dr. Agus Cope and Dr. Marianne Torres    Anesthesia: General    Indications for Surgery: The patient is a 38-year-old woman who was found to have an incidental mass in the mesentery and retroperitoneum of her duodenum.  She now presents for surgical treatment.    Procedure in Detail: After informed consent the patient was brought the operating room given a general anesthetic.  She was prepped and draped in usual fashion.  I made a midline incision with a scalpel.  The Bovie cautery was used to incise the subcutaneous tissues.  The fascia was incised.  Self-retaining retractors were placed.  We mobilized the transverse colon and retracted it superiorly.  We mobilized the small bowel and retracted inferiorly.  We are able to palpate a firm mass in the retroperitoneum.  The mass was involving the mesentery of the duodenum.  We carefully dissected the mass out from around the surrounding structures.  Strict hemostasis was obtained with surgical clips and 3-0 silk sutures.  There is a fair amount of blood vessels going into the mass which were also ligated with the harmonic scalpel.  We dissected the specimen off the fourth portion of the duodenum middle of the proximal jejunum.  There was no bowel injury.  The specimen was removed and sent to pathology.  I did excise a new margin laterally because there was some firm tissue there.  Hemostatic agents were placed in the retroperitoneum and mobilized the omentum and placed it in the retroperitoneum peritoneal cavity was irrigated well with saline.  The fascia was closed with a a 2-0 PDS suture.  A tap block was performed with quarter percent Marcaine.  The dermis was closed with interrupted 3-0 Vicryl suture and the skin was closed a running 4-0 PDS subcuticular stitch.  Dermabond was placed and the patient  was taken to the recovery room          Agus Cope MD, MS  Surgical Oncology

## 2024-02-14 NOTE — ANESTHESIA CARE TRANSFER NOTE
Patient: Erlinda Reyes    Procedure: Procedure(s):  Exploratory Laparotomy, Retroperitoneal Tumor Removal       Diagnosis: Retroperitoneal mass [R19.00]  Diagnosis Additional Information: No value filed.    Anesthesia Type:   General     Note:    Oropharynx: oropharynx clear of all foreign objects and spontaneously breathing  Level of Consciousness: drowsy  Oxygen Supplementation: face mask  Level of Supplemental Oxygen (L/min / FiO2): 10  Independent Airway: airway patency satisfactory and stable  Dentition: dentition unchanged  Vital Signs Stable: post-procedure vital signs reviewed and stable  Report to RN Given: handoff report given  Patient transferred to: PACU  Comments: Patient with previous difficult airway history. Per chart review, easy mask with anterior airway with difficult intubating with a stylet. Per records, eventually able to intubate using a bougie.  We used a VL hyperangulated blade (Glidescope LoPro 3) with grade 1 view and intubated using fiberoptic.    Handoff Report: Identifed the Patient, Identified the Reponsible Provider, Reviewed the pertinent medical history, Discussed the surgical course, Reviewed Intra-OP anesthesia mangement and issues during anesthesia, Set expectations for post-procedure period and Allowed opportunity for questions and acknowledgement of understanding      Vitals:  Vitals Value Taken Time   /60 02/14/24 1017   Temp     Pulse 80 02/14/24 1021   Resp 20 02/14/24 1021   SpO2 100 % 02/14/24 1021   Vitals shown include unfiled device data.    Electronically Signed By: Axel Rivers MD  February 14, 2024  10:23 AM

## 2024-02-14 NOTE — ANESTHESIA POSTPROCEDURE EVALUATION
Patient: Erlinda Reyes    Procedure: Procedure(s):  Exploratory Laparotomy, Retroperitoneal Tumor Removal       Anesthesia Type:  General    Note:  Disposition: Inpatient   Postop Pain Control: Uneventful            Sign Out: Well controlled pain   PONV: No   Neuro/Psych: Uneventful            Sign Out: Acceptable/Baseline neuro status   Airway/Respiratory: Uneventful            Sign Out: Acceptable/Baseline resp. status   CV/Hemodynamics: Uneventful            Sign Out: Acceptable CV status; No obvious hypovolemia; No obvious fluid overload   Other NRE: NONE   DID A NON-ROUTINE EVENT OCCUR?            Last vitals:  Vitals Value Taken Time   /63 02/14/24 1145   Temp 36.5  C (97.7  F) 02/14/24 1020   Pulse 75 02/14/24 1149   Resp 15 02/14/24 1130   SpO2 100 % 02/14/24 1149   Vitals shown include unfiled device data.    Electronically Signed By: Ramon Mcginnis MD  February 14, 2024  11:50 AM

## 2024-02-14 NOTE — PHARMACY-ADMISSION MEDICATION HISTORY
Pharmacist Admission Medication History    Admission medication history is complete. The information provided in this note is only as accurate as the sources available at the time of the update.    Information Source(s): Family member and Caregiver via phone    Changes made to PTA medication list:    Deleted: Furosemide 20 mg daily    Allergies reviewed with patient and updates made in EHR: yes    Medication History Completed By: Campos Vela RPH 2/14/2024 4:21 PM    PTA Med List   Medication Sig Note Last Dose    clindamycin (CLEOCIN T) 1 % external solution Apply 5 mLs topically as needed  More than a month    clonazePAM (KLONOPIN) 0.5 MG tablet Take 0.5 mg by mouth 2 times daily as needed for anxiety  2/13/2024    ferrous sulfate (FEROSUL) 325 (65 Fe) MG tablet Take 1 tablet by mouth daily  2/13/2024    folic acid (FOLVITE) 1 MG tablet Take 1 tablet by mouth daily  2/13/2024    Lacosamide (VIMPAT) 100 MG TABS tablet Take 100 mg by mouth 2 times daily  2/14/2024 at 0500    levETIRAcetam (KEPPRA) 500 MG tablet Take 4 tablets by mouth 2 times daily  2/14/2024 at 0500    metoprolol tartrate (LOPRESSOR) 25 MG tablet Take 1 tablet by mouth 2 times daily  2/14/2024 at 0500    omeprazole (PRILOSEC) 20 MG DR capsule Take 20 mg by mouth  2/14/2024 at 0500    potassium chloride ER (KLOR-CON M) 20 MEQ CR tablet Take 20 mEq by mouth 2 times daily  2/13/2024    sertraline (ZOLOFT) 25 MG tablet Take 1 tablet by mouth every evening  2/13/2024    sodium chloride 1 GM tablet Take 1 g by mouth 2 times daily  2/14/2024 at 0500    torsemide (DEMADEX) 5 MG tablet Take 1 tablet by mouth 2 times daily 2/12/2024: HOLD DOS 2/13/2024    Vitamin D3 50 mcg (2000 units) tablet Take 1 tablet by mouth daily 2/12/2024: HOLD DOS 2/13/2024

## 2024-02-14 NOTE — PLAN OF CARE
Goal Outcome Evaluation:    Neuro: A&Ox4.   Cardiac: SR. VSS.   Respiratory: Sating 98% on RA.  GI/: nair patent with adequate urine output  Diet/appetite: NPO pt is lethargic and want to sleep.   Activity:  Assist of two, up to chair and in halls.  Pain: At acceptable level on current regimen.   Skin: No new deficits noted.  LDA's: PIV patent and functioning.   Plan: Continue with POC. Notify primary team with changes.

## 2024-02-15 ENCOUNTER — APPOINTMENT (OUTPATIENT)
Dept: OCCUPATIONAL THERAPY | Facility: CLINIC | Age: 39
DRG: 336 | End: 2024-02-15
Attending: STUDENT IN AN ORGANIZED HEALTH CARE EDUCATION/TRAINING PROGRAM
Payer: COMMERCIAL

## 2024-02-15 LAB
ANION GAP SERPL CALCULATED.3IONS-SCNC: 8 MMOL/L (ref 7–15)
BUN SERPL-MCNC: 13.7 MG/DL (ref 6–20)
CALCIUM SERPL-MCNC: 7.3 MG/DL (ref 8.6–10)
CHLORIDE SERPL-SCNC: 108 MMOL/L (ref 98–107)
CREAT SERPL-MCNC: 0.71 MG/DL (ref 0.51–0.95)
DEPRECATED HCO3 PLAS-SCNC: 24 MMOL/L (ref 22–29)
EGFRCR SERPLBLD CKD-EPI 2021: >90 ML/MIN/1.73M2
ERYTHROCYTE [DISTWIDTH] IN BLOOD BY AUTOMATED COUNT: 12.6 % (ref 10–15)
ERYTHROCYTE [DISTWIDTH] IN BLOOD BY AUTOMATED COUNT: 12.6 % (ref 10–15)
GLUCOSE BLDC GLUCOMTR-MCNC: 89 MG/DL (ref 70–99)
GLUCOSE SERPL-MCNC: 90 MG/DL (ref 70–99)
HCT VFR BLD AUTO: 27.3 % (ref 35–47)
HCT VFR BLD AUTO: 28.6 % (ref 35–47)
HGB BLD-MCNC: 8.8 G/DL (ref 11.7–15.7)
HGB BLD-MCNC: 9.5 G/DL (ref 11.7–15.7)
HOLD SPECIMEN: NORMAL
MAGNESIUM SERPL-MCNC: 1.7 MG/DL (ref 1.7–2.3)
MCH RBC QN AUTO: 30.8 PG (ref 26.5–33)
MCH RBC QN AUTO: 30.8 PG (ref 26.5–33)
MCHC RBC AUTO-ENTMCNC: 32.2 G/DL (ref 31.5–36.5)
MCHC RBC AUTO-ENTMCNC: 33.2 G/DL (ref 31.5–36.5)
MCV RBC AUTO: 93 FL (ref 78–100)
MCV RBC AUTO: 96 FL (ref 78–100)
PHOSPHATE SERPL-MCNC: 1.9 MG/DL (ref 2.5–4.5)
PHOSPHATE SERPL-MCNC: 2.1 MG/DL (ref 2.5–4.5)
PLATELET # BLD AUTO: 81 10E3/UL (ref 150–450)
PLATELET # BLD AUTO: 87 10E3/UL (ref 150–450)
POTASSIUM SERPL-SCNC: 3.8 MMOL/L (ref 3.4–5.3)
RBC # BLD AUTO: 2.86 10E6/UL (ref 3.8–5.2)
RBC # BLD AUTO: 3.08 10E6/UL (ref 3.8–5.2)
SODIUM SERPL-SCNC: 140 MMOL/L (ref 135–145)
WBC # BLD AUTO: 7.4 10E3/UL (ref 4–11)
WBC # BLD AUTO: 7.8 10E3/UL (ref 4–11)

## 2024-02-15 PROCEDURE — 97165 OT EVAL LOW COMPLEX 30 MIN: CPT | Mod: GO | Performed by: OCCUPATIONAL THERAPIST

## 2024-02-15 PROCEDURE — 97535 SELF CARE MNGMENT TRAINING: CPT | Mod: GO | Performed by: OCCUPATIONAL THERAPIST

## 2024-02-15 PROCEDURE — 120N000002 HC R&B MED SURG/OB UMMC

## 2024-02-15 PROCEDURE — 85014 HEMATOCRIT: CPT

## 2024-02-15 PROCEDURE — 85014 HEMATOCRIT: CPT | Performed by: STUDENT IN AN ORGANIZED HEALTH CARE EDUCATION/TRAINING PROGRAM

## 2024-02-15 PROCEDURE — 250N000013 HC RX MED GY IP 250 OP 250 PS 637: Performed by: STUDENT IN AN ORGANIZED HEALTH CARE EDUCATION/TRAINING PROGRAM

## 2024-02-15 PROCEDURE — 36416 COLLJ CAPILLARY BLOOD SPEC: CPT | Performed by: SURGERY

## 2024-02-15 PROCEDURE — 84100 ASSAY OF PHOSPHORUS: CPT | Performed by: SURGERY

## 2024-02-15 PROCEDURE — 250N000013 HC RX MED GY IP 250 OP 250 PS 637: Performed by: PHYSICIAN ASSISTANT

## 2024-02-15 PROCEDURE — 258N000003 HC RX IP 258 OP 636

## 2024-02-15 PROCEDURE — 36415 COLL VENOUS BLD VENIPUNCTURE: CPT

## 2024-02-15 PROCEDURE — 83735 ASSAY OF MAGNESIUM: CPT

## 2024-02-15 PROCEDURE — 250N000011 HC RX IP 250 OP 636: Performed by: STUDENT IN AN ORGANIZED HEALTH CARE EDUCATION/TRAINING PROGRAM

## 2024-02-15 PROCEDURE — 36416 COLLJ CAPILLARY BLOOD SPEC: CPT | Performed by: STUDENT IN AN ORGANIZED HEALTH CARE EDUCATION/TRAINING PROGRAM

## 2024-02-15 PROCEDURE — 80048 BASIC METABOLIC PNL TOTAL CA: CPT

## 2024-02-15 PROCEDURE — 84100 ASSAY OF PHOSPHORUS: CPT

## 2024-02-15 PROCEDURE — 258N000003 HC RX IP 258 OP 636: Performed by: SURGERY

## 2024-02-15 PROCEDURE — 250N000009 HC RX 250: Performed by: SURGERY

## 2024-02-15 RX ORDER — ACETAMINOPHEN 325 MG/1
975 TABLET ORAL EVERY 8 HOURS PRN
Status: DISCONTINUED | OUTPATIENT
Start: 2024-02-15 | End: 2024-02-15

## 2024-02-15 RX ORDER — POTASSIUM PHOS IN 0.9 % NACL 15MMOL/250
15 PLASTIC BAG, INJECTION (ML) INTRAVENOUS ONCE
Qty: 250 ML | Refills: 0 | Status: COMPLETED | OUTPATIENT
Start: 2024-02-15 | End: 2024-02-15

## 2024-02-15 RX ORDER — ACETAMINOPHEN 325 MG/1
975 TABLET ORAL EVERY 8 HOURS
Status: DISCONTINUED | OUTPATIENT
Start: 2024-02-15 | End: 2024-02-19 | Stop reason: HOSPADM

## 2024-02-15 RX ORDER — ENOXAPARIN SODIUM 100 MG/ML
40 INJECTION SUBCUTANEOUS EVERY 24 HOURS
Status: DISCONTINUED | OUTPATIENT
Start: 2024-02-16 | End: 2024-02-19 | Stop reason: HOSPADM

## 2024-02-15 RX ADMIN — CLONAZEPAM 0.5 MG: 0.5 TABLET ORAL at 22:15

## 2024-02-15 RX ADMIN — TORSEMIDE 5 MG: 5 TABLET ORAL at 08:24

## 2024-02-15 RX ADMIN — ACETAMINOPHEN 975 MG: 325 TABLET, FILM COATED ORAL at 11:05

## 2024-02-15 RX ADMIN — LEVETIRACETAM 2000 MG: 500 TABLET, FILM COATED ORAL at 20:12

## 2024-02-15 RX ADMIN — LEVETIRACETAM 2000 MG: 500 TABLET, FILM COATED ORAL at 08:23

## 2024-02-15 RX ADMIN — LACOSAMIDE 100 MG: 50 TABLET, FILM COATED ORAL at 20:13

## 2024-02-15 RX ADMIN — SODIUM CHLORIDE TAB 1 GM 1 G: 1 TAB at 20:13

## 2024-02-15 RX ADMIN — SODIUM CHLORIDE, POTASSIUM CHLORIDE, SODIUM LACTATE AND CALCIUM CHLORIDE 500 ML: 600; 310; 30; 20 INJECTION, SOLUTION INTRAVENOUS at 06:16

## 2024-02-15 RX ADMIN — SERTRALINE HYDROCHLORIDE 25 MG: 25 TABLET ORAL at 22:15

## 2024-02-15 RX ADMIN — TORSEMIDE 5 MG: 5 TABLET ORAL at 20:12

## 2024-02-15 RX ADMIN — SODIUM CHLORIDE TAB 1 GM 1 G: 1 TAB at 08:22

## 2024-02-15 RX ADMIN — PANTOPRAZOLE SODIUM 40 MG: 40 TABLET, DELAYED RELEASE ORAL at 08:23

## 2024-02-15 RX ADMIN — ENOXAPARIN SODIUM 40 MG: 40 INJECTION SUBCUTANEOUS at 08:23

## 2024-02-15 RX ADMIN — POTASSIUM PHOSPHATE, MONOBASIC POTASSIUM PHOSPHATE, DIBASIC 15 MMOL: 224; 236 INJECTION, SOLUTION, CONCENTRATE INTRAVENOUS at 15:13

## 2024-02-15 RX ADMIN — ACETAMINOPHEN 975 MG: 325 TABLET, FILM COATED ORAL at 19:18

## 2024-02-15 RX ADMIN — LACOSAMIDE 100 MG: 50 TABLET, FILM COATED ORAL at 08:22

## 2024-02-15 ASSESSMENT — ACTIVITIES OF DAILY LIVING (ADL)
ADLS_ACUITY_SCORE: 24
ADLS_ACUITY_SCORE: 24
ADLS_ACUITY_SCORE: 34
ADLS_ACUITY_SCORE: 24
ADLS_ACUITY_SCORE: 24
ADLS_ACUITY_SCORE: 34
ADLS_ACUITY_SCORE: 24
ADLS_ACUITY_SCORE: 34
ADLS_ACUITY_SCORE: 24

## 2024-02-15 NOTE — PLAN OF CARE
Goal Outcome Evaluation: Ongoing, progressing    Plan of Care Reviewed With: patient    Overall Patient Progress: no change    Outcome Evaluation: Awoke pt at 0500 and pt reported midline abdominal pain and new LUQ pain 9/10 with difficulty breathing. M.D. Ordered tylenol and fluids. Pain improved after consistant PCA dose frequency. Pt is alert and oriented and able to make needs known. Slept well between cares. Continue to monitor for changes.

## 2024-02-15 NOTE — PLAN OF CARE
Goal Outcome Evaluation: 8550-1284      Plan of Care Reviewed With: patient, parent    Overall Patient Progress: no change    Outcome Evaluation: A&O x 4.  VSS on 1L NC.  CAPNO in place.  Attempted to wean to room air but pt desatted while asleep.  Will remain on 1L for now.  c/o pain 4-5/10.  PCA pump in use.  Abdominal incision closed with sutures and JORDY with no drainage.  A-1 for rolling in bed.  Tolerates CLD with no n/v.  2L fluid restriction.  Murphy in place with yellow output.  To be removed post op day 2.  Last BM yesterday.  Continue to monitor and follow POC.

## 2024-02-15 NOTE — PROGRESS NOTES
Surgical Oncology Progress Note    Interval History:  POD 1  500 ml fluid bolus given this morning for low urine output. Having pain at the surgical site.    Physical Exam:   Temp:  [97.7  F (36.5  C)-99.9  F (37.7  C)] 99.9  F (37.7  C)  Pulse:  [] 110  Resp:  [10-20] 20  BP: (100-120)/(47-79) 101/47  SpO2:  [93 %-100 %] 93 %  General: Alert, oriented, appears comfortable, NAD.  Respiratory: breathing non labored  Abdomen: Abdomen is soft, appropriately tender, non-distended. Incision is clean, dry and intact.     Data:   All laboratory and imaging data in the past 24 hours reviewed  I/O last 3 completed shifts:  In: 1589.75 [I.V.:1589.75]  Out: 525 [Urine:425; Blood:100]  Recent Labs   Lab Test 02/14/24  1104   WBC 10.0   HGB 10.9*   *      Recent Labs   Lab Test 02/15/24  0700 02/14/24  1409 02/14/24  1104   NA  --  139 138   POTASSIUM  --  4.8 4.1   CHLORIDE  --  106 104   CO2  --  21* 23   BUN  --  14.8 14.5   CR  --  0.60 0.66   ANIONGAP  --  12 11   TOMMY  --  7.9* 8.1*   GLC 89 127* 125*     Assessment and Plan:     Erlinda Reyes is a 38 year old year old female with retroperitoneal mass.     POD 1 s/p exploratory laparotomy and removal of 2 cm retroperitoneal mass with Dr. Cope 2/14/24.     - Available for pain is scheduled acetaminophen and dilaudid PCA. Abdominal binder prn for comfort.   - Wean oxygen as able. Incentive spirometry, out of bed and ambulation  - Clear liquid diet  - Keep nair catheter to monitor urine output and until ambulating.   - Home clonazepam, lacosamide, keppra, metroprolol, ptx, zoloft, sodium chloride, and torsemide resumed.   - Anemia secondary to surgical blood loss and dilution. Monitor.   - Lovenox for DVT prophylaxis.     Seen with Chief resident who will discuss with Staff.     EMILY LiveC   Surgical Oncology   [phone]

## 2024-02-15 NOTE — PROGRESS NOTES
02/15/24 1001   Appointment Info   Signing Clinician's Name / Credentials (OT) Randy Ward OTR/L   Living Environment   People in Home   (lives with her Mother)   Current Living Arrangements house   Home Accessibility stairs to enter home;stairs within home   Number of Stairs, Main Entrance 3   Number of Stairs, Within Home, Primary greater than 10 stairs   Transportation Anticipated family or friend will provide   Living Environment Comments Mother is home to A prn   Self-Care   Usual Activity Tolerance good   Current Activity Tolerance fair   Regular Exercise No   Equipment Currently Used at Home shower chair  (R AFO)   Activity/Exercise/Self-Care Comment R sided weakness   Instrumental Activities of Daily Living (IADL)   Previous Responsibilities   (Mother does most IADLs)   General Information   Onset of Illness/Injury or Date of Surgery 02/14/24   Referring Physician Marianne Torres MD   Patient/Family Therapy Goal Statement (OT) to return home   Additional Occupational Profile Info/Pertinent History of Current Problem POD 1 s/p exploratory laparotomy and removal of 2 cm retroperitoneal mass with Dr. Cope 2/14/24.   Cognitive Status Examination   Orientation Status orientation to person, place and time   Visual Perception   Visual Impairment/Limitations WNL   Sensory   Sensory Quick Adds sensation intact   Pain Assessment   Patient Currently in Pain Yes, see Vital Sign flowsheet   Range of Motion Comprehensive   General Range of Motion   (R shoulder limited at neutral- this is below baseline)   Strength Comprehensive (MMT)   General Manual Muscle Testing (MMT) Assessment   (R UE more weak iunable to test 2/2 pain and limited ROM)   Muscle Tone Assessment   Muscle Tone Quick Adds No deficits were identified   Coordination   Upper Extremity Coordination Right UE impaired   Bed Mobility   Bed Mobility   (Pt unable to log roll 2/2 pain)   Activities of Daily Living   BADL Assessment/Intervention lower body  dressing;bathing;toileting;grooming   Bathing Assessment/Intervention   [place] Level (Bathing) maximum assist (25% patient effort)   Lower Body Dressing Assessment/Training   [place] Level (Lower Body Dressing) dependent (less than 25% patient effort)   Grooming Assessment/Training   [place] Level (Grooming) minimum assist (75% patient effort)   Toileting   [place] Level (Toileting) dependent (less than 25% patient effort)   Clinical Impression   Criteria for Skilled Therapeutic Interventions Met (OT) Yes, treatment indicated   OT Diagnosis Decrease in ADL Ind and tolerance   Influenced by the following impairments Pain, ROM, abdominal precautions   OT Problem List-Impairments impacting ADL post-surgical precautions;pain;range of motion (ROM);strength   Assessment of Occupational Performance 5 or more Performance Deficits   Identified Performance Deficits home mgmt, leisure, dressing, bathing, toileting, grooming   Planned Therapy Interventions (OT) ADL retraining;transfer training;home program guidelines;progressive activity/exercise   Clinical Decision Making Complexity (OT) problem focused assessment/low complexity   Risk & Benefits of therapy have been explained evaluation/treatment results reviewed;care plan/treatment goals reviewed;participants voiced agreement with care plan   Clinical Impression Comments Pt may benefit from skilled OT to help increase ADL I and tolerance post abdominal surgery   OT Total Evaluation Time   OT Eval, Low Complexity Minutes (09408) 10   OT Goals   Therapy Frequency (OT) 6 times/week   OT Predicted Duration/Target Date for Goal Attainment 02/29/24   OT Goals Hygiene/Grooming;Lower Body Dressing;Toilet Transfer/Toileting   OT: Hygiene/Grooming supervision/stand-by assist   OT: Lower Body Dressing Supervision/stand-by assist   OT: Toilet Transfer/Toileting Supervision/stand-by assist   OT Discharge Planning   OT Plan OT: OOB ADL:s, reinforce abdominal  precautions   OT Discharge Recommendation (DC Rec) home with assist   OT Rationale for DC Rec Anticipate pt would be able to return home with A from family at time of discharge. Family has been A with ADLs and IADLs per pt. At this time pt would require TCU given inability to mobilize, however anticipate this will improve with pain mgmt and presence of mother.   OT Brief overview of current status unable/unwilling to tx out of bed, dependent for ADLs   OT Equipment Needed at Discharge   (TBD)

## 2024-02-16 ENCOUNTER — APPOINTMENT (OUTPATIENT)
Dept: CT IMAGING | Facility: CLINIC | Age: 39
DRG: 336 | End: 2024-02-16
Attending: PHYSICIAN ASSISTANT
Payer: COMMERCIAL

## 2024-02-16 LAB
ALBUMIN SERPL BCG-MCNC: 2.9 G/DL (ref 3.5–5.2)
ALP SERPL-CCNC: 71 U/L (ref 40–150)
ALT SERPL W P-5'-P-CCNC: 18 U/L (ref 0–50)
ANION GAP SERPL CALCULATED.3IONS-SCNC: 9 MMOL/L (ref 7–15)
AST SERPL W P-5'-P-CCNC: 32 U/L (ref 0–45)
BILIRUB SERPL-MCNC: 0.3 MG/DL
BUN SERPL-MCNC: 9.6 MG/DL (ref 6–20)
CALCIUM SERPL-MCNC: 8.5 MG/DL (ref 8.6–10)
CHLORIDE SERPL-SCNC: 109 MMOL/L (ref 98–107)
CREAT SERPL-MCNC: 0.56 MG/DL (ref 0.51–0.95)
DEPRECATED HCO3 PLAS-SCNC: 22 MMOL/L (ref 22–29)
EGFRCR SERPLBLD CKD-EPI 2021: >90 ML/MIN/1.73M2
ERYTHROCYTE [DISTWIDTH] IN BLOOD BY AUTOMATED COUNT: 12.6 % (ref 10–15)
ERYTHROCYTE [DISTWIDTH] IN BLOOD BY AUTOMATED COUNT: 12.8 % (ref 10–15)
GLUCOSE SERPL-MCNC: 105 MG/DL (ref 70–99)
HCT VFR BLD AUTO: 24.1 % (ref 35–47)
HCT VFR BLD AUTO: 26.8 % (ref 35–47)
HGB BLD-MCNC: 7.9 G/DL (ref 11.7–15.7)
HGB BLD-MCNC: 9 G/DL (ref 11.7–15.7)
MAGNESIUM SERPL-MCNC: 1.8 MG/DL (ref 1.7–2.3)
MCH RBC QN AUTO: 29.9 PG (ref 26.5–33)
MCH RBC QN AUTO: 30.3 PG (ref 26.5–33)
MCHC RBC AUTO-ENTMCNC: 32.8 G/DL (ref 31.5–36.5)
MCHC RBC AUTO-ENTMCNC: 33.6 G/DL (ref 31.5–36.5)
MCV RBC AUTO: 90 FL (ref 78–100)
MCV RBC AUTO: 91 FL (ref 78–100)
PHOSPHATE SERPL-MCNC: 1.6 MG/DL (ref 2.5–4.5)
PLATELET # BLD AUTO: 78 10E3/UL (ref 150–450)
PLATELET # BLD AUTO: 78 10E3/UL (ref 150–450)
POTASSIUM SERPL-SCNC: 5.2 MMOL/L (ref 3.4–5.3)
PROT SERPL-MCNC: 5.3 G/DL (ref 6.4–8.3)
RBC # BLD AUTO: 2.64 10E6/UL (ref 3.8–5.2)
RBC # BLD AUTO: 2.97 10E6/UL (ref 3.8–5.2)
SODIUM SERPL-SCNC: 140 MMOL/L (ref 135–145)
WBC # BLD AUTO: 6.1 10E3/UL (ref 4–11)
WBC # BLD AUTO: 7 10E3/UL (ref 4–11)

## 2024-02-16 PROCEDURE — 36415 COLL VENOUS BLD VENIPUNCTURE: CPT | Performed by: STUDENT IN AN ORGANIZED HEALTH CARE EDUCATION/TRAINING PROGRAM

## 2024-02-16 PROCEDURE — 36416 COLLJ CAPILLARY BLOOD SPEC: CPT | Performed by: STUDENT IN AN ORGANIZED HEALTH CARE EDUCATION/TRAINING PROGRAM

## 2024-02-16 PROCEDURE — 250N000013 HC RX MED GY IP 250 OP 250 PS 637

## 2024-02-16 PROCEDURE — 80053 COMPREHEN METABOLIC PANEL: CPT | Performed by: STUDENT IN AN ORGANIZED HEALTH CARE EDUCATION/TRAINING PROGRAM

## 2024-02-16 PROCEDURE — 93005 ELECTROCARDIOGRAM TRACING: CPT

## 2024-02-16 PROCEDURE — 120N000002 HC R&B MED SURG/OB UMMC

## 2024-02-16 PROCEDURE — 250N000011 HC RX IP 250 OP 636: Performed by: PHYSICIAN ASSISTANT

## 2024-02-16 PROCEDURE — 85014 HEMATOCRIT: CPT | Performed by: STUDENT IN AN ORGANIZED HEALTH CARE EDUCATION/TRAINING PROGRAM

## 2024-02-16 PROCEDURE — 71275 CT ANGIOGRAPHY CHEST: CPT | Mod: 26 | Performed by: RADIOLOGY

## 2024-02-16 PROCEDURE — 93010 ELECTROCARDIOGRAM REPORT: CPT | Performed by: INTERNAL MEDICINE

## 2024-02-16 PROCEDURE — 71275 CT ANGIOGRAPHY CHEST: CPT

## 2024-02-16 PROCEDURE — 250N000013 HC RX MED GY IP 250 OP 250 PS 637: Performed by: SURGERY

## 2024-02-16 PROCEDURE — 250N000013 HC RX MED GY IP 250 OP 250 PS 637: Performed by: PHYSICIAN ASSISTANT

## 2024-02-16 PROCEDURE — 258N000003 HC RX IP 258 OP 636: Performed by: STUDENT IN AN ORGANIZED HEALTH CARE EDUCATION/TRAINING PROGRAM

## 2024-02-16 PROCEDURE — 250N000013 HC RX MED GY IP 250 OP 250 PS 637: Performed by: STUDENT IN AN ORGANIZED HEALTH CARE EDUCATION/TRAINING PROGRAM

## 2024-02-16 PROCEDURE — 250N000011 HC RX IP 250 OP 636: Performed by: SURGERY

## 2024-02-16 PROCEDURE — 84100 ASSAY OF PHOSPHORUS: CPT | Performed by: STUDENT IN AN ORGANIZED HEALTH CARE EDUCATION/TRAINING PROGRAM

## 2024-02-16 PROCEDURE — 83735 ASSAY OF MAGNESIUM: CPT | Performed by: STUDENT IN AN ORGANIZED HEALTH CARE EDUCATION/TRAINING PROGRAM

## 2024-02-16 RX ORDER — IOPAMIDOL 755 MG/ML
72 INJECTION, SOLUTION INTRAVASCULAR ONCE
Status: COMPLETED | OUTPATIENT
Start: 2024-02-16 | End: 2024-02-16

## 2024-02-16 RX ORDER — CALCIUM CARBONATE 500 MG/1
500 TABLET, CHEWABLE ORAL 3 TIMES DAILY PRN
Status: DISCONTINUED | OUTPATIENT
Start: 2024-02-16 | End: 2024-02-19 | Stop reason: HOSPADM

## 2024-02-16 RX ORDER — LANOLIN ALCOHOL/MO/W.PET/CERES
3 CREAM (GRAM) TOPICAL
Status: DISCONTINUED | OUTPATIENT
Start: 2024-02-16 | End: 2024-02-19 | Stop reason: HOSPADM

## 2024-02-16 RX ADMIN — TORSEMIDE 5 MG: 5 TABLET ORAL at 20:06

## 2024-02-16 RX ADMIN — LACOSAMIDE 100 MG: 50 TABLET, FILM COATED ORAL at 08:26

## 2024-02-16 RX ADMIN — SODIUM CHLORIDE TAB 1 GM 1 G: 1 TAB at 13:44

## 2024-02-16 RX ADMIN — POTASSIUM & SODIUM PHOSPHATES POWDER PACK 280-160-250 MG 1 PACKET: 280-160-250 PACK at 23:07

## 2024-02-16 RX ADMIN — Medication 12.5 MG: at 13:45

## 2024-02-16 RX ADMIN — ACETAMINOPHEN 975 MG: 325 TABLET, FILM COATED ORAL at 13:44

## 2024-02-16 RX ADMIN — PANTOPRAZOLE SODIUM 40 MG: 40 TABLET, DELAYED RELEASE ORAL at 13:44

## 2024-02-16 RX ADMIN — ENOXAPARIN SODIUM 40 MG: 40 INJECTION SUBCUTANEOUS at 13:37

## 2024-02-16 RX ADMIN — CLONAZEPAM 0.5 MG: 0.5 TABLET ORAL at 23:07

## 2024-02-16 RX ADMIN — TORSEMIDE 5 MG: 5 TABLET ORAL at 13:45

## 2024-02-16 RX ADMIN — LEVETIRACETAM 2000 MG: 500 TABLET, FILM COATED ORAL at 08:26

## 2024-02-16 RX ADMIN — Medication 3 MG: at 04:46

## 2024-02-16 RX ADMIN — SODIUM CHLORIDE: 9 INJECTION, SOLUTION INTRAVENOUS at 01:24

## 2024-02-16 RX ADMIN — IOPAMIDOL 72 ML: 755 INJECTION, SOLUTION INTRAVENOUS at 10:06

## 2024-02-16 RX ADMIN — LEVETIRACETAM 2000 MG: 500 TABLET, FILM COATED ORAL at 20:04

## 2024-02-16 RX ADMIN — CALCIUM CARBONATE (ANTACID) CHEW TAB 500 MG 500 MG: 500 CHEW TAB at 13:44

## 2024-02-16 RX ADMIN — Medication 12.5 MG: at 20:04

## 2024-02-16 RX ADMIN — CALCIUM CARBONATE (ANTACID) CHEW TAB 500 MG 500 MG: 500 CHEW TAB at 11:30

## 2024-02-16 RX ADMIN — MENTHOL 1 PATCH: 205.5 PATCH TOPICAL at 23:57

## 2024-02-16 RX ADMIN — SODIUM CHLORIDE TAB 1 GM 1 G: 1 TAB at 20:03

## 2024-02-16 RX ADMIN — SERTRALINE HYDROCHLORIDE 25 MG: 25 TABLET ORAL at 23:07

## 2024-02-16 RX ADMIN — LACOSAMIDE 100 MG: 50 TABLET, FILM COATED ORAL at 20:03

## 2024-02-16 RX ADMIN — ACETAMINOPHEN 975 MG: 325 TABLET, FILM COATED ORAL at 03:25

## 2024-02-16 RX ADMIN — POTASSIUM & SODIUM PHOSPHATES POWDER PACK 280-160-250 MG 1 PACKET: 280-160-250 PACK at 13:51

## 2024-02-16 ASSESSMENT — ACTIVITIES OF DAILY LIVING (ADL)
ADLS_ACUITY_SCORE: 34
ADLS_ACUITY_SCORE: 33
ADLS_ACUITY_SCORE: 34
ADLS_ACUITY_SCORE: 33
ADLS_ACUITY_SCORE: 34
ADLS_ACUITY_SCORE: 33

## 2024-02-16 NOTE — PLAN OF CARE
"Goal Outcome Evaluation:    /52 (BP Location: Left arm)   Pulse 100   Temp 98.5  F (36.9  C) (Oral)   Resp 20   Ht 1.473 m (4' 10\")   Wt 67.4 kg (148 lb 9.4 oz)   LMP  (LMP Unknown)   SpO2 95%   BMI 31.06 kg/m      7428-1504    Neuro: A&Ox4.   Cardiac: SR. VSS.   Respiratory: Sating 95% on RA.  GI/: Adequate urine output via nair.  Diet/appetite: Tolerating clear liquids diet.   Activity:  Assist of two with a gait belt and a walker up to chair and in halls.  Pain: At acceptable level on current regimen.   Skin: No new deficits noted.  LDA's:PIV infusing, PCA, NS and phos.  New this shift: Did not give metoprolol due to low bp 101/42, provider was inform and will put in parameter. Pt was ambulated in the [facility] twice during this shift. She requires the assist of Oxygen at 1 liter, she de sat to low 80's on room air during walking.  Plan: Continue with POC. Notify primary team with changes.  "

## 2024-02-16 NOTE — PROGRESS NOTES
Surgical Oncology Progress Note    Interval History:  POD 2  She coughed up some mucus overnight. Developed chest pain and required high flow oxygen. Now in 1L of oxygen via NC. Tachycardia to 110. Continued mid chest pain. Not passing flatus or stool. Headache improved with acetaminophen.     Physical Exam:   Temp:  [98.2  F (36.8  C)-98.5  F (36.9  C)] 98.2  F (36.8  C)  Pulse:  [100-122] 110  Resp:  [18-20] 19  BP: (100-116)/(49-55) 115/55  SpO2:  [94 %-97 %] 97 %  General: Alert, oriented, appears comfortable, NAD.  Respiratory: breathing non labored  Abdomen: Abdomen is soft, non-tender, non-distended. Incision is clean, dry and intact.     Data:   All laboratory and imaging data in the past 24 hours reviewed  I/O last 3 completed shifts:  In: 811 [P.O.:800; I.V.:11]  Out: 1225 [Urine:1225]  Recent Labs   Lab Test 02/16/24  0726 02/15/24  1655 02/15/24  0836   WBC 6.1 7.4 7.8   HGB 7.9* 9.5* 8.8*   PLT 78* 81* 87*      Recent Labs   Lab Test 02/16/24  0726 02/15/24  0734 02/15/24  0700 02/14/24  1409    140  --  139   POTASSIUM 5.2 3.8  --  4.8   CHLORIDE 109* 108*  --  106   CO2 22 24  --  21*   BUN 9.6 13.7  --  14.8   CR 0.56 0.71  --  0.60   ANIONGAP 9 8  --  12   TOMMY 8.5* 7.3*  --  7.9*   * 90 89 127*      Recent Labs   Lab Test 02/16/24  0726   PROTTOTAL 5.3*   ALBUMIN 2.9*   BILITOTAL 0.3   ALKPHOS 71   AST 32   ALT 18     Assessment and Plan:     Erlinda Reyes is a 38 year old year old female with retroperitoneal mass.      POD 2 s/p exploratory laparotomy and removal of 2 cm retroperitoneal mass with Dr. Cope 2/14/24.     Increased oxygen requirements overnight (now improved), tachycardia, and chest pain. Will work up for PE and obtain EKG.     - Available for pain is scheduled acetaminophen and dilaudid PCA. Abdominal binder prn for comfort. Menthol patches.   - CT PE and EKG today.   - Wean oxygen as able. Incentive spirometry, out of bed and ambulation  - Clear liquid diet  -  Discontinue nair catheter  - Home clonazepam, lacosamide, keppra, metroprolol, ptx, zoloft, sodium chloride, and torsemide resumed.   - Anemia secondary to surgical blood loss and dilution. Monitor.   - Hypophosphatemia: monitor and replace  - Lovenox for DVT prophylaxis.       Seen with Dr. Cope.     EMILY LiveC   Surgical Oncology   [phone]

## 2024-02-16 NOTE — PLAN OF CARE
Goal Outcome Evaluation:               Pt is alert and oriented x 4. VSS on 1 L NC. Murphy in with adequate output. Pain managed by PCA dilaudid. Abdominal incision, open to air, CDI. Takes PO meds fine. Slept in between cares. Cont POC.

## 2024-02-17 ENCOUNTER — APPOINTMENT (OUTPATIENT)
Dept: PHYSICAL THERAPY | Facility: CLINIC | Age: 39
DRG: 336 | End: 2024-02-17
Attending: STUDENT IN AN ORGANIZED HEALTH CARE EDUCATION/TRAINING PROGRAM
Payer: COMMERCIAL

## 2024-02-17 LAB
ANION GAP SERPL CALCULATED.3IONS-SCNC: 10 MMOL/L (ref 7–15)
BUN SERPL-MCNC: 7.8 MG/DL (ref 6–20)
CALCIUM SERPL-MCNC: 7.8 MG/DL (ref 8.6–10)
CHLORIDE SERPL-SCNC: 106 MMOL/L (ref 98–107)
CREAT SERPL-MCNC: 0.54 MG/DL (ref 0.51–0.95)
DEPRECATED HCO3 PLAS-SCNC: 24 MMOL/L (ref 22–29)
EGFRCR SERPLBLD CKD-EPI 2021: >90 ML/MIN/1.73M2
ERYTHROCYTE [DISTWIDTH] IN BLOOD BY AUTOMATED COUNT: 12.5 % (ref 10–15)
GLUCOSE SERPL-MCNC: 85 MG/DL (ref 70–99)
HCT VFR BLD AUTO: 22.2 % (ref 35–47)
HGB BLD-MCNC: 7.6 G/DL (ref 11.7–15.7)
MAGNESIUM SERPL-MCNC: 1.7 MG/DL (ref 1.7–2.3)
MCH RBC QN AUTO: 30.8 PG (ref 26.5–33)
MCHC RBC AUTO-ENTMCNC: 34.2 G/DL (ref 31.5–36.5)
MCV RBC AUTO: 90 FL (ref 78–100)
PHOSPHATE SERPL-MCNC: 1.8 MG/DL (ref 2.5–4.5)
PHOSPHATE SERPL-MCNC: 1.9 MG/DL (ref 2.5–4.5)
PLATELET # BLD AUTO: 83 10E3/UL (ref 150–450)
POTASSIUM SERPL-SCNC: 3.6 MMOL/L (ref 3.4–5.3)
RBC # BLD AUTO: 2.47 10E6/UL (ref 3.8–5.2)
SODIUM SERPL-SCNC: 140 MMOL/L (ref 135–145)
WBC # BLD AUTO: 5.4 10E3/UL (ref 4–11)

## 2024-02-17 PROCEDURE — 97530 THERAPEUTIC ACTIVITIES: CPT | Mod: GP

## 2024-02-17 PROCEDURE — 258N000003 HC RX IP 258 OP 636: Performed by: SURGERY

## 2024-02-17 PROCEDURE — 36416 COLLJ CAPILLARY BLOOD SPEC: CPT | Performed by: STUDENT IN AN ORGANIZED HEALTH CARE EDUCATION/TRAINING PROGRAM

## 2024-02-17 PROCEDURE — 84100 ASSAY OF PHOSPHORUS: CPT | Performed by: STUDENT IN AN ORGANIZED HEALTH CARE EDUCATION/TRAINING PROGRAM

## 2024-02-17 PROCEDURE — 97161 PT EVAL LOW COMPLEX 20 MIN: CPT | Mod: GP

## 2024-02-17 PROCEDURE — 250N000013 HC RX MED GY IP 250 OP 250 PS 637: Performed by: PHYSICIAN ASSISTANT

## 2024-02-17 PROCEDURE — 250N000011 HC RX IP 250 OP 636: Performed by: STUDENT IN AN ORGANIZED HEALTH CARE EDUCATION/TRAINING PROGRAM

## 2024-02-17 PROCEDURE — 120N000002 HC R&B MED SURG/OB UMMC

## 2024-02-17 PROCEDURE — 85027 COMPLETE CBC AUTOMATED: CPT | Performed by: STUDENT IN AN ORGANIZED HEALTH CARE EDUCATION/TRAINING PROGRAM

## 2024-02-17 PROCEDURE — 83735 ASSAY OF MAGNESIUM: CPT | Performed by: STUDENT IN AN ORGANIZED HEALTH CARE EDUCATION/TRAINING PROGRAM

## 2024-02-17 PROCEDURE — 84100 ASSAY OF PHOSPHORUS: CPT | Performed by: SURGERY

## 2024-02-17 PROCEDURE — 250N000009 HC RX 250: Performed by: SURGERY

## 2024-02-17 PROCEDURE — 258N000003 HC RX IP 258 OP 636: Performed by: STUDENT IN AN ORGANIZED HEALTH CARE EDUCATION/TRAINING PROGRAM

## 2024-02-17 PROCEDURE — 250N000013 HC RX MED GY IP 250 OP 250 PS 637: Performed by: STUDENT IN AN ORGANIZED HEALTH CARE EDUCATION/TRAINING PROGRAM

## 2024-02-17 PROCEDURE — 250N000013 HC RX MED GY IP 250 OP 250 PS 637

## 2024-02-17 PROCEDURE — 999N000127 HC STATISTIC PERIPHERAL IV START W US GUIDANCE

## 2024-02-17 PROCEDURE — 97110 THERAPEUTIC EXERCISES: CPT | Mod: GP

## 2024-02-17 PROCEDURE — 36415 COLL VENOUS BLD VENIPUNCTURE: CPT | Performed by: SURGERY

## 2024-02-17 PROCEDURE — 80048 BASIC METABOLIC PNL TOTAL CA: CPT | Performed by: STUDENT IN AN ORGANIZED HEALTH CARE EDUCATION/TRAINING PROGRAM

## 2024-02-17 RX ORDER — OXYCODONE HYDROCHLORIDE 5 MG/1
5-10 TABLET ORAL EVERY 4 HOURS PRN
Status: DISCONTINUED | OUTPATIENT
Start: 2024-02-17 | End: 2024-02-19 | Stop reason: HOSPADM

## 2024-02-17 RX ORDER — OXYCODONE HYDROCHLORIDE 5 MG/1
5 TABLET ORAL ONCE
Status: COMPLETED | OUTPATIENT
Start: 2024-02-17 | End: 2024-02-17

## 2024-02-17 RX ORDER — METOPROLOL TARTRATE 25 MG/1
25 TABLET, FILM COATED ORAL 2 TIMES DAILY
Status: DISCONTINUED | OUTPATIENT
Start: 2024-02-17 | End: 2024-02-19 | Stop reason: HOSPADM

## 2024-02-17 RX ORDER — METHOCARBAMOL 500 MG/1
500 TABLET, FILM COATED ORAL 4 TIMES DAILY
Status: DISCONTINUED | OUTPATIENT
Start: 2024-02-17 | End: 2024-02-19 | Stop reason: HOSPADM

## 2024-02-17 RX ORDER — POTASSIUM PHOS IN 0.9 % NACL 15MMOL/250
15 PLASTIC BAG, INJECTION (ML) INTRAVENOUS ONCE
Status: COMPLETED | OUTPATIENT
Start: 2024-02-17 | End: 2024-02-17

## 2024-02-17 RX ORDER — LIDOCAINE 4 G/G
2 PATCH TOPICAL
Status: DISCONTINUED | OUTPATIENT
Start: 2024-02-18 | End: 2024-02-19 | Stop reason: HOSPADM

## 2024-02-17 RX ORDER — HYDROMORPHONE HCL IN WATER/PF 6 MG/30 ML
.2-.4 PATIENT CONTROLLED ANALGESIA SYRINGE INTRAVENOUS
Status: DISCONTINUED | OUTPATIENT
Start: 2024-02-17 | End: 2024-02-19

## 2024-02-17 RX ORDER — HYDROMORPHONE HCL IN WATER/PF 6 MG/30 ML
0.2 PATIENT CONTROLLED ANALGESIA SYRINGE INTRAVENOUS ONCE
Status: DISCONTINUED | OUTPATIENT
Start: 2024-02-17 | End: 2024-02-17

## 2024-02-17 RX ADMIN — Medication 25 MG: at 19:53

## 2024-02-17 RX ADMIN — OXYCODONE HYDROCHLORIDE 5 MG: 5 TABLET ORAL at 14:12

## 2024-02-17 RX ADMIN — CLONAZEPAM 0.5 MG: 0.5 TABLET ORAL at 22:12

## 2024-02-17 RX ADMIN — ENOXAPARIN SODIUM 40 MG: 40 INJECTION SUBCUTANEOUS at 14:12

## 2024-02-17 RX ADMIN — METHOCARBAMOL 500 MG: 500 TABLET ORAL at 19:37

## 2024-02-17 RX ADMIN — LACOSAMIDE 100 MG: 50 TABLET, FILM COATED ORAL at 19:37

## 2024-02-17 RX ADMIN — LEVETIRACETAM 2000 MG: 500 TABLET, FILM COATED ORAL at 19:37

## 2024-02-17 RX ADMIN — TORSEMIDE 5 MG: 5 TABLET ORAL at 19:38

## 2024-02-17 RX ADMIN — LEVETIRACETAM 2000 MG: 500 TABLET, FILM COATED ORAL at 11:45

## 2024-02-17 RX ADMIN — CALCIUM CARBONATE (ANTACID) CHEW TAB 500 MG 500 MG: 500 CHEW TAB at 11:44

## 2024-02-17 RX ADMIN — TORSEMIDE 5 MG: 5 TABLET ORAL at 11:44

## 2024-02-17 RX ADMIN — SERTRALINE HYDROCHLORIDE 25 MG: 25 TABLET ORAL at 22:12

## 2024-02-17 RX ADMIN — SODIUM CHLORIDE TAB 1 GM 1 G: 1 TAB at 11:44

## 2024-02-17 RX ADMIN — ACETAMINOPHEN 975 MG: 325 TABLET, FILM COATED ORAL at 19:37

## 2024-02-17 RX ADMIN — SODIUM CHLORIDE TAB 1 GM 1 G: 1 TAB at 19:37

## 2024-02-17 RX ADMIN — LACOSAMIDE 100 MG: 50 TABLET, FILM COATED ORAL at 11:44

## 2024-02-17 RX ADMIN — MENTHOL 1 PATCH: 205.5 PATCH TOPICAL at 08:24

## 2024-02-17 RX ADMIN — ACETAMINOPHEN 975 MG: 325 TABLET, FILM COATED ORAL at 03:42

## 2024-02-17 RX ADMIN — POTASSIUM PHOSPHATE, MONOBASIC POTASSIUM PHOSPHATE, DIBASIC 15 MMOL: 224; 236 INJECTION, SOLUTION, CONCENTRATE INTRAVENOUS at 14:12

## 2024-02-17 RX ADMIN — Medication 25 MG: at 11:44

## 2024-02-17 RX ADMIN — SODIUM CHLORIDE: 9 INJECTION, SOLUTION INTRAVENOUS at 04:25

## 2024-02-17 RX ADMIN — OXYCODONE HYDROCHLORIDE 10 MG: 5 TABLET ORAL at 19:37

## 2024-02-17 RX ADMIN — ACETAMINOPHEN 975 MG: 325 TABLET, FILM COATED ORAL at 11:43

## 2024-02-17 RX ADMIN — PANTOPRAZOLE SODIUM 40 MG: 40 TABLET, DELAYED RELEASE ORAL at 11:45

## 2024-02-17 ASSESSMENT — ACTIVITIES OF DAILY LIVING (ADL)
ADLS_ACUITY_SCORE: 33

## 2024-02-17 NOTE — PLAN OF CARE
Goal Outcome Evaluation:                   Outcome Evaluation: Pt is alert and oriented x 4. VSS on 1 L NC sating in the high 90's. Pt uses the bed side commode several times this shift. NO BM.  Pain managed by PCA dilaudid rating it 6/10. Abdominal incision, open to air, CDI. Takes meds PO . Slept in between cares. Cont POC.

## 2024-02-17 NOTE — PLAN OF CARE
"Goal Outcome Evaluation:      Plan of Care Reviewed With: patient, parent    Overall Patient Progress: no change    Outcome Evaluation: VSS on 1L NC, cont. pulse ox and CAPNO monitoring in place. Pain adequately managed with dilaudid PCA and scheduled tylenol. Replacing phosphorus per protocol. Murphy removed d/t POD2, now voiding spontaneously in BSC. Up SBA x2 with walker and gait belt. Midline abdominal incision CDI and JORDY. Still no BM since 2/13. Pad and underwear on d/t menses. Tums added PRN to help with acid reflux. Sleeping between cares    /59 (BP Location: Right arm)   Pulse 100   Temp 98.4  F (36.9  C) (Oral)   Resp 18   Ht 1.473 m (4' 10\")   Wt 67.4 kg (148 lb 9.4 oz)   LMP  (LMP Unknown)   SpO2 92%   BMI 31.06 kg/m       Lalita Mora RN on 2/16/2024 at 6:38 PM       "

## 2024-02-17 NOTE — PROGRESS NOTES
"   02/17/24 1045   Appointment Info   Signing Clinician's Name / Credentials (PT) Jeannie Guthrie, SPT   Student Supervision Direct Patient Contact Provided   Rehab Comments (PT) R AFO for ambulation       Present no   Living Environment   People in Home parent(s)  (Mom and Step dad)   Current Living Arrangements house   Home Accessibility stairs to enter home;stairs within home   Number of Stairs, Main Entrance 3   Number of Stairs, Within Home, Primary greater than 10 stairs  (16)   Stair Railings, Within Home, Primary railing on right side (ascending)   Transportation Anticipated family or friend will provide   Living Environment Comments Pt reports that mother is home to assist as needed throughout day.   Self-Care   Usual Activity Tolerance good   Current Activity Tolerance fair   Regular Exercise No   Equipment Currently Used at Home orthosis;shower chair  (R AFO)   Fall history within last six months no   Activity/Exercise/Self-Care Comment Pt is typically SBA for all ADLs and mobility in the home, mother is around for assistance PRN. R sided weakness at baseline, R AFO.   General Information   Onset of Illness/Injury or Date of Surgery 02/14/24   Referring Physician Agus Guzman   Patient/Family Therapy Goals Statement (PT) To return home and to PLOF   Pertinent History of Current Problem (include personal factors and/or comorbidities that impact the POC) Per EMR, \"Patient is a 38-year-old woman with a new mass in her retroperitoneum.  She recently had a CT scan in September for flank pain.  A new lipomatous mass was identified in the retroperitoneum near the duodenum the mass measured approximately 2.6 cm in size and was well defined.  She had a CT scan on January 16, 2022 and I do not see the mass at that time.  She is asymptomatic from this mass.  Her past medical history significant for brain tumor that was removed when she was 2.  She has SIADH.  She has had a history of a " "cholecystectomy and foot surgery. Now POD 3 s/p exploratory laparotomy and removal of 2 cm retroperitoneal mass with Dr. Cope 2/14/24.\"   Existing Precautions/Restrictions abdominal;fall   General Observations Up with assist   Cognition   Affect/Mental Status (Cognition) WFL   Orientation Status (Cognition) oriented x 4   Follows Commands (Cognition) WFL   Cognitive Status Comments Pt looks to mother in room for assistance/confirmation when answering questions.   Pain Assessment   Patient Currently in Pain Yes, see Vital Sign flowsheet  (Abdominal pain)   Integumentary/Edema   Integumentary/Edema no deficits were identifed   Posture    Posture Forward head position;Protracted shoulders   Range of Motion (ROM)   ROM Comment Per observation, BLE ROM appears WFL   Strength (Manual Muscle Testing)   Strength Comments Per observation, BLE strength appears at least > 3/5. R side appears weaker than L side, pt reports this is baseline.   Bed Mobility   Comment, (Bed Mobility) Janet for bilateral rolling and sidelying > sit   Transfers   Comment, (Transfers) Pt declined further activity once seated EOB, per clinical judgement anticipate CGA-Janet for STS from EOB.   Gait/Stairs (Locomotion)   Comment, (Gait/Stairs) Pt declined further activity once seated EOB, per clinical judgement anticipate CGA for ambulation with FWW.   Balance   Balance no deficits were identified   Balance Comments Pt stable while seated EOB, no significant sway noted.   Sensory Examination   Sensory Perception other (describe)   Sensory Perception Comments Pt with diminshed sensation of R foot due to history of foot surgery. All other sensation WNL.   Muscle Tone   Muscle Tone no deficits were identified   Clinical Impression   Criteria for Skilled Therapeutic Intervention Yes, treatment indicated   PT Diagnosis (PT) Functional mobility deficits   Influenced by the following impairments Pain, decreased activity tolerance, strength deficits "   Functional limitations due to impairments Bed mobility, transfers, ambulation   Clinical Presentation (PT Evaluation Complexity) stable   Clinical Presentation Rationale clinical reasoning, pt presentation   Clinical Decision Making (Complexity) low complexity   Planned Therapy Interventions (PT) balance training;gait training;home exercise program;neuromuscular re-education;patient/family education;ROM (range of motion);stair training;strengthening;transfer training;progressive activity/exercise   Risk & Benefits of therapy have been explained evaluation/treatment results reviewed;care plan/treatment goals reviewed;risks/benefits reviewed;current/potential barriers reviewed;participants voiced agreement with care plan;participants included;patient   Clinical Impression Comments Pt is a 39 yo female who presents s/p exploratory laparotomy and removal of retroperitoneal mass. Presented to PT today with abdominal pain, decreased activity tolerance, and functional strength deficits. These impairments are impacting her ability to complete bed mobility, trasnfers and ambulation IND. Pt would benefit from skilled PT to improve functional strength and activity tolerance to improve IND prior to d/c.   PT Total Evaluation Time   PT Cat, Low Complexity Minutes (39485) 15   Physical Therapy Goals   PT Frequency 6x/week   PT Predicted Duration/Target Date for Goal Attainment 02/29/24   PT Goals Bed Mobility;Transfers;Gait;Stairs   PT: Bed Mobility Independent;Supine to/from sit;Rolling;Within precautions   PT: Transfers Independent;Sit to/from stand;Bed to/from chair;Within precautions   PT: Gait Independent;Within precautions;Greater than 200 feet   PT: Stairs Supervision/stand-by assist;Greater than 10 stairs;Rail on right  (16 stairs)   PT Discharge Planning   PT Plan review precautions, bed mobility, STS, gait   PT Discharge Recommendation (DC Rec) home with assist;home with home care physical therapy;Transitional Care  Facility   PT Rationale for DC Rec Pt is below baseline mobility mostly limited by pain at this time. Anticipate that once pain is managed, pt will be able to d/c home with assist from family. Pending progress with PT, pt may require continued rehab at d/c to improve functional strength for navigating stairs at home.   PT Brief overview of current status Janet bed mobility   Total Session Time   Total Session Time (sum of timed and untimed services) 15

## 2024-02-17 NOTE — PROGRESS NOTES
Surgical Oncology Progress Note    Interval History:  POD 3  Continued chest/reflux pain. PE study negative. Pt states that it feels like her usual reflux pain. EKG NSR. Intermittent tachycardia, asymptomatic. Now on RA. Did not get out of bed nor ambulate yesterday. Low appetite. Good UOP, no issues voiding.     Physical Exam:   Temp:  [96.2  F (35.7  C)-98.6  F (37  C)] 96.2  F (35.7  C)  Pulse:  [] 100  Resp:  [16-18] 16  BP: (122-143)/(52-61) 122/52  SpO2:  [92 %-98 %] 98 %  General: Alert, oriented, appears comfortable, NAD.  Respiratory: breathing non labored  Abdomen: Abdomen is soft, non-tender, non-distended. Incision is clean, dry and intact.     Data:   All laboratory and imaging data in the past 24 hours reviewed  I/O last 3 completed shifts:  In: 174 [P.O.:150; I.V.:24]  Out: 2225 [Urine:2225]  Recent Labs   Lab Test 02/17/24  0602 02/16/24  1423 02/16/24  0726   WBC 5.4 7.0 6.1   HGB 7.6* 9.0* 7.9*   PLT 83* 78* 78*      Recent Labs   Lab Test 02/17/24  0602 02/16/24  0726 02/15/24  0734    140 140   POTASSIUM 3.6 5.2 3.8   CHLORIDE 106 109* 108*   CO2 24 22 24   BUN 7.8 9.6 13.7   CR 0.54 0.56 0.71   ANIONGAP 10 9 8   TOMMY 7.8* 8.5* 7.3*   GLC 85 105* 90      Recent Labs   Lab Test 02/16/24  0726   PROTTOTAL 5.3*   ALBUMIN 2.9*   BILITOTAL 0.3   ALKPHOS 71   AST 32   ALT 18     Assessment and Plan:     Erlinda Reyes is a 38 year old year old female with retroperitoneal mass.      POD 3 s/p exploratory laparotomy and removal of 2 cm retroperitoneal mass with Dr. Cope 2/14/24.       - Available for pain is scheduled acetaminophen and dilaudid PCA. Abdominal binder prn for comfort. Menthol patches.   - CT PE and EKG yesterday negative for PE and EKG showing NSR   - Incentive spirometry, out of bed and ambulation  - Clear liquid diet  - Strict I&O  - Home clonazepam, lacosamide, keppra, metroprolol, ptx, zoloft, sodium chloride, and torsemide resumed.   - Anemia secondary to surgical blood  loss and dilution. Monitor.   - Hypophosphatemia: monitor and replace  - Lovenox for DVT prophylaxis.   - Ambulate, OOB    Seen with Dr. Palomo.     Marianne Torres MD   Surgical Oncology

## 2024-02-18 ENCOUNTER — APPOINTMENT (OUTPATIENT)
Dept: OCCUPATIONAL THERAPY | Facility: CLINIC | Age: 39
DRG: 336 | End: 2024-02-18
Attending: SURGERY
Payer: COMMERCIAL

## 2024-02-18 LAB
ANION GAP SERPL CALCULATED.3IONS-SCNC: 12 MMOL/L (ref 7–15)
BUN SERPL-MCNC: 9.3 MG/DL (ref 6–20)
CALCIUM SERPL-MCNC: 8.1 MG/DL (ref 8.6–10)
CHLORIDE SERPL-SCNC: 102 MMOL/L (ref 98–107)
CREAT SERPL-MCNC: 0.58 MG/DL (ref 0.51–0.95)
DEPRECATED HCO3 PLAS-SCNC: 25 MMOL/L (ref 22–29)
EGFRCR SERPLBLD CKD-EPI 2021: >90 ML/MIN/1.73M2
ERYTHROCYTE [DISTWIDTH] IN BLOOD BY AUTOMATED COUNT: 12.6 % (ref 10–15)
GLUCOSE SERPL-MCNC: 73 MG/DL (ref 70–99)
HCT VFR BLD AUTO: 24.3 % (ref 35–47)
HGB BLD-MCNC: 8 G/DL (ref 11.7–15.7)
MAGNESIUM SERPL-MCNC: 1.8 MG/DL (ref 1.7–2.3)
MCH RBC QN AUTO: 30 PG (ref 26.5–33)
MCHC RBC AUTO-ENTMCNC: 32.9 G/DL (ref 31.5–36.5)
MCV RBC AUTO: 91 FL (ref 78–100)
PHOSPHATE SERPL-MCNC: 1.9 MG/DL (ref 2.5–4.5)
PHOSPHATE SERPL-MCNC: 2.7 MG/DL (ref 2.5–4.5)
PLATELET # BLD AUTO: 97 10E3/UL (ref 150–450)
POTASSIUM SERPL-SCNC: 3.1 MMOL/L (ref 3.4–5.3)
POTASSIUM SERPL-SCNC: 4.7 MMOL/L (ref 3.4–5.3)
RBC # BLD AUTO: 2.67 10E6/UL (ref 3.8–5.2)
SODIUM SERPL-SCNC: 139 MMOL/L (ref 135–145)
WBC # BLD AUTO: 4.7 10E3/UL (ref 4–11)

## 2024-02-18 PROCEDURE — 84100 ASSAY OF PHOSPHORUS: CPT | Performed by: STUDENT IN AN ORGANIZED HEALTH CARE EDUCATION/TRAINING PROGRAM

## 2024-02-18 PROCEDURE — 258N000003 HC RX IP 258 OP 636: Performed by: SURGERY

## 2024-02-18 PROCEDURE — 85014 HEMATOCRIT: CPT | Performed by: STUDENT IN AN ORGANIZED HEALTH CARE EDUCATION/TRAINING PROGRAM

## 2024-02-18 PROCEDURE — 36415 COLL VENOUS BLD VENIPUNCTURE: CPT | Performed by: STUDENT IN AN ORGANIZED HEALTH CARE EDUCATION/TRAINING PROGRAM

## 2024-02-18 PROCEDURE — 83735 ASSAY OF MAGNESIUM: CPT | Performed by: STUDENT IN AN ORGANIZED HEALTH CARE EDUCATION/TRAINING PROGRAM

## 2024-02-18 PROCEDURE — 250N000013 HC RX MED GY IP 250 OP 250 PS 637: Performed by: PHYSICIAN ASSISTANT

## 2024-02-18 PROCEDURE — 250N000011 HC RX IP 250 OP 636: Performed by: STUDENT IN AN ORGANIZED HEALTH CARE EDUCATION/TRAINING PROGRAM

## 2024-02-18 PROCEDURE — 97535 SELF CARE MNGMENT TRAINING: CPT | Mod: GO

## 2024-02-18 PROCEDURE — 84100 ASSAY OF PHOSPHORUS: CPT | Performed by: SURGERY

## 2024-02-18 PROCEDURE — 250N000013 HC RX MED GY IP 250 OP 250 PS 637: Performed by: SURGERY

## 2024-02-18 PROCEDURE — 250N000013 HC RX MED GY IP 250 OP 250 PS 637: Performed by: STUDENT IN AN ORGANIZED HEALTH CARE EDUCATION/TRAINING PROGRAM

## 2024-02-18 PROCEDURE — 999N000127 HC STATISTIC PERIPHERAL IV START W US GUIDANCE

## 2024-02-18 PROCEDURE — 80048 BASIC METABOLIC PNL TOTAL CA: CPT | Performed by: STUDENT IN AN ORGANIZED HEALTH CARE EDUCATION/TRAINING PROGRAM

## 2024-02-18 PROCEDURE — 93010 ELECTROCARDIOGRAM REPORT: CPT | Performed by: INTERNAL MEDICINE

## 2024-02-18 PROCEDURE — 84132 ASSAY OF SERUM POTASSIUM: CPT | Performed by: SURGERY

## 2024-02-18 PROCEDURE — 120N000002 HC R&B MED SURG/OB UMMC

## 2024-02-18 PROCEDURE — 36416 COLLJ CAPILLARY BLOOD SPEC: CPT | Performed by: SURGERY

## 2024-02-18 PROCEDURE — 250N000013 HC RX MED GY IP 250 OP 250 PS 637

## 2024-02-18 PROCEDURE — 250N000009 HC RX 250: Performed by: SURGERY

## 2024-02-18 RX ORDER — POTASSIUM PHOS IN 0.9 % NACL 15MMOL/250
15 PLASTIC BAG, INJECTION (ML) INTRAVENOUS ONCE
Status: COMPLETED | OUTPATIENT
Start: 2024-02-18 | End: 2024-02-18

## 2024-02-18 RX ORDER — AMOXICILLIN 250 MG
1 CAPSULE ORAL 2 TIMES DAILY
Status: DISCONTINUED | OUTPATIENT
Start: 2024-02-18 | End: 2024-02-19 | Stop reason: HOSPADM

## 2024-02-18 RX ORDER — POTASSIUM CHLORIDE 750 MG/1
40 TABLET, EXTENDED RELEASE ORAL ONCE
Status: COMPLETED | OUTPATIENT
Start: 2024-02-18 | End: 2024-02-18

## 2024-02-18 RX ORDER — POLYETHYLENE GLYCOL 3350 17 G/17G
17 POWDER, FOR SOLUTION ORAL DAILY
Status: DISCONTINUED | OUTPATIENT
Start: 2024-02-18 | End: 2024-02-19 | Stop reason: HOSPADM

## 2024-02-18 RX ADMIN — OXYCODONE HYDROCHLORIDE 10 MG: 5 TABLET ORAL at 07:48

## 2024-02-18 RX ADMIN — LIDOCAINE 2 PATCH: 4 PATCH TOPICAL at 07:44

## 2024-02-18 RX ADMIN — ACETAMINOPHEN 975 MG: 325 TABLET, FILM COATED ORAL at 05:29

## 2024-02-18 RX ADMIN — LEVETIRACETAM 2000 MG: 500 TABLET, FILM COATED ORAL at 07:47

## 2024-02-18 RX ADMIN — METHOCARBAMOL 500 MG: 500 TABLET ORAL at 20:33

## 2024-02-18 RX ADMIN — SODIUM CHLORIDE TAB 1 GM 1 G: 1 TAB at 20:09

## 2024-02-18 RX ADMIN — CALCIUM CARBONATE (ANTACID) CHEW TAB 500 MG 500 MG: 500 CHEW TAB at 11:04

## 2024-02-18 RX ADMIN — ENOXAPARIN SODIUM 40 MG: 40 INJECTION SUBCUTANEOUS at 13:34

## 2024-02-18 RX ADMIN — SODIUM CHLORIDE TAB 1 GM 1 G: 1 TAB at 07:47

## 2024-02-18 RX ADMIN — LEVETIRACETAM 2000 MG: 500 TABLET, FILM COATED ORAL at 20:09

## 2024-02-18 RX ADMIN — POTASSIUM PHOSPHATE, MONOBASIC POTASSIUM PHOSPHATE, DIBASIC 15 MMOL: 224; 236 INJECTION, SOLUTION, CONCENTRATE INTRAVENOUS at 09:51

## 2024-02-18 RX ADMIN — TORSEMIDE 5 MG: 5 TABLET ORAL at 20:09

## 2024-02-18 RX ADMIN — METHOCARBAMOL 500 MG: 500 TABLET ORAL at 17:25

## 2024-02-18 RX ADMIN — METHOCARBAMOL 500 MG: 500 TABLET ORAL at 11:02

## 2024-02-18 RX ADMIN — OXYCODONE HYDROCHLORIDE 10 MG: 5 TABLET ORAL at 02:31

## 2024-02-18 RX ADMIN — LACOSAMIDE 100 MG: 50 TABLET, FILM COATED ORAL at 20:09

## 2024-02-18 RX ADMIN — OXYCODONE HYDROCHLORIDE 10 MG: 5 TABLET ORAL at 20:08

## 2024-02-18 RX ADMIN — CLONAZEPAM 0.5 MG: 0.5 TABLET ORAL at 22:16

## 2024-02-18 RX ADMIN — LACOSAMIDE 100 MG: 50 TABLET, FILM COATED ORAL at 07:47

## 2024-02-18 RX ADMIN — TORSEMIDE 5 MG: 5 TABLET ORAL at 07:48

## 2024-02-18 RX ADMIN — ACETAMINOPHEN 975 MG: 325 TABLET, FILM COATED ORAL at 20:09

## 2024-02-18 RX ADMIN — SENNOSIDES AND DOCUSATE SODIUM 1 TABLET: 8.6; 5 TABLET ORAL at 20:09

## 2024-02-18 RX ADMIN — METHOCARBAMOL 500 MG: 500 TABLET ORAL at 07:47

## 2024-02-18 RX ADMIN — PANTOPRAZOLE SODIUM 40 MG: 40 TABLET, DELAYED RELEASE ORAL at 07:48

## 2024-02-18 RX ADMIN — ACETAMINOPHEN 975 MG: 325 TABLET, FILM COATED ORAL at 11:02

## 2024-02-18 RX ADMIN — Medication 25 MG: at 20:09

## 2024-02-18 RX ADMIN — POTASSIUM PHOSPHATE, MONOBASIC POTASSIUM PHOSPHATE, DIBASIC 9 MMOL: 224; 236 INJECTION, SOLUTION, CONCENTRATE INTRAVENOUS at 20:09

## 2024-02-18 RX ADMIN — OXYCODONE HYDROCHLORIDE 10 MG: 5 TABLET ORAL at 17:25

## 2024-02-18 RX ADMIN — SERTRALINE HYDROCHLORIDE 25 MG: 25 TABLET ORAL at 22:16

## 2024-02-18 RX ADMIN — Medication 25 MG: at 07:47

## 2024-02-18 RX ADMIN — POTASSIUM CHLORIDE 40 MEQ: 750 TABLET, EXTENDED RELEASE ORAL at 09:47

## 2024-02-18 ASSESSMENT — ACTIVITIES OF DAILY LIVING (ADL)
ADLS_ACUITY_SCORE: 33
ADLS_ACUITY_SCORE: 31
ADLS_ACUITY_SCORE: 33

## 2024-02-18 NOTE — PLAN OF CARE
Goal Outcome Evaluation:             Outcome Evaluation: no acute change this shift.alert and oriented x 4. VSS on 1 L NC sating in the high 90's. Pt uses the bed side commode several times this shift. Still NO BM.  Pain managed by PO oxy and scheduled tylenol. Abdominal incision, open to air, CDI. Slept in between cares. Cont POC.

## 2024-02-18 NOTE — PLAN OF CARE
Goal Outcome Evaluation:                  Pt c/o cp this morning, thought it might be heartburn, VSS, updated MD and EKG done which is NSR, up in chair tolerating fll liquid diet, pt having soft loose stools, declined bowel meds, voiding in BSC, pt c/o headache, abd pain, nausea, gas pain, heartburn and using scheduled and prn meds with some releif, possible shower this jordan with moms help, po potassium replaced and iv phos replaced with redraws this jordan

## 2024-02-18 NOTE — PLAN OF CARE
"Goal Outcome Evaluation:      Plan of Care Reviewed With: patient, parent    Overall Patient Progress: improving    Outcome Evaluation: VSS on 1L NC ex. intermittent HTN managed with scheduled metoprolol. Pt up SBA w/walker to BSC. Moving well, previously A1-2 OOB. Willing to participate in PT/OT today, previously refused. 2 PIVs dislodged during shift, L PIV replaced. PCA pump stopped while IVs out, now pt requesting to stay off PCA d/t too many tubes attached to pt. Paged team to request transitioning from pump to IV push pain meds and possible discontinuation of CAPNO monitoring to reduce amount of cords and tubes. PCA discontinued and IV dilaudid, PO oxy, and lidocaine patches added. Pt now resting in bed with heat pads for lower abdominal cramps. Continue to monitor and follow POC.    /65 (BP Location: Left arm)   Pulse 96   Temp 98.6  F (37  C) (Oral)   Resp 16   Ht 1.473 m (4' 10\")   Wt 67.4 kg (148 lb 9.4 oz)   LMP  (LMP Unknown)   SpO2 97%   BMI 31.06 kg/m       aLlita Mora RN on 2/17/2024 at 6:02 PM       "

## 2024-02-18 NOTE — PROGRESS NOTES
Surgical Oncology Progress Note    Interval History:  POD 4  Feeling better. No nausea. Voiding without difficulty with good amounts. Ambulated. No flatus nor BM. Declines suppository.     Physical Exam:   Temp:  [98.4  F (36.9  C)-98.6  F (37  C)] 98.6  F (37  C)  Pulse:  [84-96] 84  Resp:  [16] 16  BP: (123-133)/(51-65) 123/51  SpO2:  [96 %-97 %] 97 %  General: Alert, oriented, appears comfortable, NAD.  Respiratory: breathing non labored  Abdomen: Abdomen is soft, non-tender, non-distended. Incision is clean, dry and intact.     Data:   All laboratory and imaging data in the past 24 hours reviewed  I/O last 3 completed shifts:  In: 1775 [P.O.:630; I.V.:1145]  Out: 975 [Urine:975]  Recent Labs   Lab Test 02/18/24  0636 02/17/24  0602 02/16/24  1423   WBC 4.7 5.4 7.0   HGB 8.0* 7.6* 9.0*   PLT 97* 83* 78*      Recent Labs   Lab Test 02/18/24  0636 02/17/24  0602 02/16/24  0726    140 140   POTASSIUM 3.1* 3.6 5.2   CHLORIDE 102 106 109*   CO2 25 24 22   BUN 9.3 7.8 9.6   CR 0.58 0.54 0.56   ANIONGAP 12 10 9   TOMMY 8.1* 7.8* 8.5*   GLC 73 85 105*      Recent Labs   Lab Test 02/16/24  0726   PROTTOTAL 5.3*   ALBUMIN 2.9*   BILITOTAL 0.3   ALKPHOS 71   AST 32   ALT 18     Assessment and Plan:     Erlinda Reyes is a 38 year old year old female with retroperitoneal mass.      POD 4 s/p exploratory laparotomy and removal of 2 cm retroperitoneal mass with Dr. Cope 2/14/24.       - Available for pain is scheduled acetaminophen and dilaudid PCA. Abdominal binder prn for comfort. Menthol patches.   - Incentive spirometry, out of bed and ambulation  - Full liquid diet  - Scheduled bowel regimen  - Strict I&O  - Home clonazepam, lacosamide, keppra, metroprolol, ptx, zoloft, sodium chloride, and torsemide resumed.   - Anemia secondary to surgical blood loss and dilution. Monitor.   - Hypophosphatemia: monitor and replace  - Hypokalemia: monitor and replace  - Lovenox for DVT prophylaxis.   - Ambulate, OOB    Seen with  Dr. Palomo.     Marianne Torres MD   Surgical Oncology

## 2024-02-19 ENCOUNTER — APPOINTMENT (OUTPATIENT)
Dept: OCCUPATIONAL THERAPY | Facility: CLINIC | Age: 39
DRG: 336 | End: 2024-02-19
Attending: SURGERY
Payer: COMMERCIAL

## 2024-02-19 ENCOUNTER — APPOINTMENT (OUTPATIENT)
Dept: PHYSICAL THERAPY | Facility: CLINIC | Age: 39
DRG: 336 | End: 2024-02-19
Attending: SURGERY
Payer: COMMERCIAL

## 2024-02-19 VITALS
WEIGHT: 151.68 LBS | TEMPERATURE: 98 F | RESPIRATION RATE: 20 BRPM | SYSTOLIC BLOOD PRESSURE: 111 MMHG | HEART RATE: 93 BPM | HEIGHT: 58 IN | OXYGEN SATURATION: 98 % | BODY MASS INDEX: 31.84 KG/M2 | DIASTOLIC BLOOD PRESSURE: 60 MMHG

## 2024-02-19 PROBLEM — E83.39 HYPOPHOSPHATEMIA: Status: ACTIVE | Noted: 2024-02-19

## 2024-02-19 PROBLEM — D64.9 ANEMIA: Status: ACTIVE | Noted: 2024-02-19

## 2024-02-19 LAB
ANION GAP SERPL CALCULATED.3IONS-SCNC: 8 MMOL/L (ref 7–15)
ATRIAL RATE - MUSE: 83 BPM
ATRIAL RATE - MUSE: 97 BPM
BUN SERPL-MCNC: 11.2 MG/DL (ref 6–20)
CALCIUM SERPL-MCNC: 8.3 MG/DL (ref 8.6–10)
CHLORIDE SERPL-SCNC: 103 MMOL/L (ref 98–107)
CREAT SERPL-MCNC: 0.54 MG/DL (ref 0.51–0.95)
DEPRECATED HCO3 PLAS-SCNC: 26 MMOL/L (ref 22–29)
DIASTOLIC BLOOD PRESSURE - MUSE: NORMAL MMHG
DIASTOLIC BLOOD PRESSURE - MUSE: NORMAL MMHG
EGFRCR SERPLBLD CKD-EPI 2021: >90 ML/MIN/1.73M2
ERYTHROCYTE [DISTWIDTH] IN BLOOD BY AUTOMATED COUNT: 12.7 % (ref 10–15)
GLUCOSE SERPL-MCNC: 86 MG/DL (ref 70–99)
HCT VFR BLD AUTO: 24.3 % (ref 35–47)
HGB BLD-MCNC: 7.9 G/DL (ref 11.7–15.7)
INTERPRETATION ECG - MUSE: NORMAL
INTERPRETATION ECG - MUSE: NORMAL
MAGNESIUM SERPL-MCNC: 1.9 MG/DL (ref 1.7–2.3)
MCH RBC QN AUTO: 30.2 PG (ref 26.5–33)
MCHC RBC AUTO-ENTMCNC: 32.5 G/DL (ref 31.5–36.5)
MCV RBC AUTO: 93 FL (ref 78–100)
P AXIS - MUSE: 34 DEGREES
P AXIS - MUSE: 46 DEGREES
PHOSPHATE SERPL-MCNC: 2.1 MG/DL (ref 2.5–4.5)
PLATELET # BLD AUTO: 106 10E3/UL (ref 150–450)
POTASSIUM SERPL-SCNC: 3.5 MMOL/L (ref 3.4–5.3)
PR INTERVAL - MUSE: 146 MS
PR INTERVAL - MUSE: 152 MS
QRS DURATION - MUSE: 72 MS
QRS DURATION - MUSE: 74 MS
QT - MUSE: 328 MS
QT - MUSE: 352 MS
QTC - MUSE: 413 MS
QTC - MUSE: 416 MS
R AXIS - MUSE: 22 DEGREES
R AXIS - MUSE: 41 DEGREES
RBC # BLD AUTO: 2.62 10E6/UL (ref 3.8–5.2)
SODIUM SERPL-SCNC: 137 MMOL/L (ref 135–145)
SYSTOLIC BLOOD PRESSURE - MUSE: NORMAL MMHG
SYSTOLIC BLOOD PRESSURE - MUSE: NORMAL MMHG
T AXIS - MUSE: -17 DEGREES
T AXIS - MUSE: 8 DEGREES
VENTRICULAR RATE- MUSE: 83 BPM
VENTRICULAR RATE- MUSE: 97 BPM
WBC # BLD AUTO: 4.4 10E3/UL (ref 4–11)

## 2024-02-19 PROCEDURE — 80048 BASIC METABOLIC PNL TOTAL CA: CPT | Performed by: STUDENT IN AN ORGANIZED HEALTH CARE EDUCATION/TRAINING PROGRAM

## 2024-02-19 PROCEDURE — 36415 COLL VENOUS BLD VENIPUNCTURE: CPT | Performed by: STUDENT IN AN ORGANIZED HEALTH CARE EDUCATION/TRAINING PROGRAM

## 2024-02-19 PROCEDURE — 250N000011 HC RX IP 250 OP 636: Performed by: STUDENT IN AN ORGANIZED HEALTH CARE EDUCATION/TRAINING PROGRAM

## 2024-02-19 PROCEDURE — 83735 ASSAY OF MAGNESIUM: CPT | Performed by: STUDENT IN AN ORGANIZED HEALTH CARE EDUCATION/TRAINING PROGRAM

## 2024-02-19 PROCEDURE — 97110 THERAPEUTIC EXERCISES: CPT | Mod: GP

## 2024-02-19 PROCEDURE — 250N000013 HC RX MED GY IP 250 OP 250 PS 637: Performed by: SURGERY

## 2024-02-19 PROCEDURE — 97116 GAIT TRAINING THERAPY: CPT | Mod: GP

## 2024-02-19 PROCEDURE — 250N000011 HC RX IP 250 OP 636

## 2024-02-19 PROCEDURE — 84100 ASSAY OF PHOSPHORUS: CPT | Performed by: STUDENT IN AN ORGANIZED HEALTH CARE EDUCATION/TRAINING PROGRAM

## 2024-02-19 PROCEDURE — 250N000013 HC RX MED GY IP 250 OP 250 PS 637: Performed by: PHYSICIAN ASSISTANT

## 2024-02-19 PROCEDURE — 250N000013 HC RX MED GY IP 250 OP 250 PS 637

## 2024-02-19 PROCEDURE — 97530 THERAPEUTIC ACTIVITIES: CPT | Mod: GO | Performed by: OCCUPATIONAL THERAPIST

## 2024-02-19 PROCEDURE — 250N000013 HC RX MED GY IP 250 OP 250 PS 637: Performed by: STUDENT IN AN ORGANIZED HEALTH CARE EDUCATION/TRAINING PROGRAM

## 2024-02-19 PROCEDURE — 85027 COMPLETE CBC AUTOMATED: CPT | Performed by: STUDENT IN AN ORGANIZED HEALTH CARE EDUCATION/TRAINING PROGRAM

## 2024-02-19 PROCEDURE — 97535 SELF CARE MNGMENT TRAINING: CPT | Mod: GO | Performed by: OCCUPATIONAL THERAPIST

## 2024-02-19 PROCEDURE — 97530 THERAPEUTIC ACTIVITIES: CPT | Mod: GP

## 2024-02-19 RX ORDER — ACETAMINOPHEN 325 MG/1
650-975 TABLET ORAL EVERY 8 HOURS PRN
Qty: 50 TABLET | Refills: 0 | Status: SHIPPED | OUTPATIENT
Start: 2024-02-19

## 2024-02-19 RX ORDER — POLYETHYLENE GLYCOL 3350 17 G/17G
17 POWDER, FOR SOLUTION ORAL DAILY PRN
Qty: 510 G | Refills: 0 | Status: SHIPPED | OUTPATIENT
Start: 2024-02-19

## 2024-02-19 RX ORDER — METHOCARBAMOL 500 MG/1
500 TABLET, FILM COATED ORAL EVERY 6 HOURS PRN
Qty: 20 TABLET | Refills: 0 | Status: SHIPPED | OUTPATIENT
Start: 2024-02-19

## 2024-02-19 RX ORDER — OXYCODONE HYDROCHLORIDE 5 MG/1
5-10 TABLET ORAL EVERY 4 HOURS PRN
Qty: 30 TABLET | Refills: 0 | Status: SHIPPED | OUTPATIENT
Start: 2024-02-19

## 2024-02-19 RX ORDER — POTASSIUM CHLORIDE 750 MG/1
20 TABLET, EXTENDED RELEASE ORAL ONCE
Status: COMPLETED | OUTPATIENT
Start: 2024-02-19 | End: 2024-02-19

## 2024-02-19 RX ADMIN — POTASSIUM & SODIUM PHOSPHATES POWDER PACK 280-160-250 MG 1 PACKET: 280-160-250 PACK at 14:50

## 2024-02-19 RX ADMIN — ENOXAPARIN SODIUM 40 MG: 40 INJECTION SUBCUTANEOUS at 14:50

## 2024-02-19 RX ADMIN — TORSEMIDE 5 MG: 5 TABLET ORAL at 12:02

## 2024-02-19 RX ADMIN — CALCIUM CARBONATE (ANTACID) CHEW TAB 500 MG 500 MG: 500 CHEW TAB at 10:28

## 2024-02-19 RX ADMIN — CALCIUM CARBONATE (ANTACID) CHEW TAB 500 MG 500 MG: 500 CHEW TAB at 14:57

## 2024-02-19 RX ADMIN — HYDROMORPHONE HYDROCHLORIDE 0.4 MG: 0.2 INJECTION, SOLUTION INTRAMUSCULAR; INTRAVENOUS; SUBCUTANEOUS at 00:40

## 2024-02-19 RX ADMIN — MENTHOL 1 PATCH: 205.5 PATCH TOPICAL at 12:09

## 2024-02-19 RX ADMIN — Medication 25 MG: at 12:02

## 2024-02-19 RX ADMIN — POTASSIUM & SODIUM PHOSPHATES POWDER PACK 280-160-250 MG 1 PACKET: 280-160-250 PACK at 17:38

## 2024-02-19 RX ADMIN — ACETAMINOPHEN 975 MG: 325 TABLET, FILM COATED ORAL at 05:13

## 2024-02-19 RX ADMIN — LACOSAMIDE 100 MG: 50 TABLET, FILM COATED ORAL at 12:02

## 2024-02-19 RX ADMIN — ACETAMINOPHEN 975 MG: 325 TABLET, FILM COATED ORAL at 12:02

## 2024-02-19 RX ADMIN — POTASSIUM CHLORIDE 20 MEQ: 750 TABLET, EXTENDED RELEASE ORAL at 10:06

## 2024-02-19 RX ADMIN — ONDANSETRON 4 MG: 4 TABLET, ORALLY DISINTEGRATING ORAL at 00:42

## 2024-02-19 RX ADMIN — SODIUM CHLORIDE TAB 1 GM 1 G: 1 TAB at 10:06

## 2024-02-19 RX ADMIN — LEVETIRACETAM 2000 MG: 500 TABLET, FILM COATED ORAL at 12:02

## 2024-02-19 RX ADMIN — OXYCODONE HYDROCHLORIDE 10 MG: 5 TABLET ORAL at 10:29

## 2024-02-19 RX ADMIN — PANTOPRAZOLE SODIUM 40 MG: 40 TABLET, DELAYED RELEASE ORAL at 10:06

## 2024-02-19 RX ADMIN — LIDOCAINE 2 PATCH: 4 PATCH TOPICAL at 10:23

## 2024-02-19 RX ADMIN — POTASSIUM & SODIUM PHOSPHATES POWDER PACK 280-160-250 MG 1 PACKET: 280-160-250 PACK at 10:06

## 2024-02-19 RX ADMIN — METHOCARBAMOL 500 MG: 500 TABLET ORAL at 12:02

## 2024-02-19 RX ADMIN — METHOCARBAMOL 500 MG: 500 TABLET ORAL at 15:51

## 2024-02-19 ASSESSMENT — ACTIVITIES OF DAILY LIVING (ADL)
ADLS_ACUITY_SCORE: 31

## 2024-02-19 NOTE — PROGRESS NOTES
Surgical Oncology Progress Note    Interval History:  POD 5  Did require 1 dose of IV dilaudid yesterday. Pain controlled this morning. Tolerating full liquid diet without nausea. Passing flatus and had a bowel movement.     Physical Exam:   Temp:  [97.7  F (36.5  C)-98.2  F (36.8  C)] 98.2  F (36.8  C)  Pulse:  [78-89] 89  Resp:  [16-18] 18  BP: (108-132)/(48-68) 114/48  SpO2:  [94 %-98 %] 97 %  General: Alert, oriented, appears comfortable, NAD.  Respiratory: breathing non labored  Abdomen: Abdomen is soft, non-tender, distended. Incision is clean, dry and intact.     Data:   All laboratory and imaging data in the past 24 hours reviewed  I/O last 3 completed shifts:  In: 240 [P.O.:240]  Out: 750 [Urine:750]  Recent Labs   Lab Test 02/19/24  0542 02/18/24  0636 02/17/24  0602   WBC 4.4 4.7 5.4   HGB 7.9* 8.0* 7.6*   * 97* 83*      Recent Labs   Lab Test 02/19/24  0542 02/18/24  1744 02/18/24  0636 02/17/24  0602     --  139 140   POTASSIUM 3.5 4.7 3.1* 3.6   CHLORIDE 103  --  102 106   CO2 26  --  25 24   BUN 11.2  --  9.3 7.8   CR 0.54  --  0.58 0.54   ANIONGAP 8  --  12 10   TOMMY 8.3*  --  8.1* 7.8*   GLC 86  --  73 85      Recent Labs   Lab Test 02/16/24  0726   PROTTOTAL 5.3*   ALBUMIN 2.9*   BILITOTAL 0.3   ALKPHOS 71   AST 32   ALT 18     Assessment and Plan:     Erlinda Reyes is a 38 year old year old female with retroperitoneal mass.      POD 5 s/p exploratory laparotomy and removal of 2 cm retroperitoneal mass with Dr. Cope 2/14/24.     Doing well.     - Available for pain is scheduled acetaminophen, robaxin scheduled, and oxycodone prn. Abdominal binder prn for comfort. Menthol patches.   - Incentive spirometry, out of bed and ambulation  - Regular diet  - Miralax, senna  - Home clonazepam, lacosamide, keppra, metroprolol, ptx, zoloft, sodium chloride, and torsemide resumed.   - Anemia secondary to surgical blood loss and dilution. Monitor.   - Hypophosphatemia: monitor and replace  -  Lovenox for DVT prophylaxis.   - PT/OT  - Discharge planning for today vs tomorrow.     Seen with Dr. Palomo.     JUSTIN Live-C   Surgical Oncology

## 2024-02-19 NOTE — PLAN OF CARE
Goal Outcome Evaluation:         Outcome Evaluation: No acute change this shift. A & O x 4. SBA x 1. Dressing CDI open to air. Cont pulse ox sating in the high 90's. No BM this shift. Pain managed by PO oxy, Tylenol and IV dilaudid Some relief. Cont POC

## 2024-02-19 NOTE — DISCHARGE SUMMARY
[facility] Discharge Summary    Erlinda Reyes MRN# [id number]   Age: 38 year old YOB: 1985     Date of Admission:  2/14/2024  Date of Discharge::  2/19/2024  6:54 PM  Admitting Physician:  Agus Cope MD  Discharge Physician:  Agus Cope MD     PCP:  Nik Perez    Disposition: Patient discharged to home in stable condition.    Admission Diagnosis:  Retroperitoneal mass    Discharge Diagnosis:  Retroperitoneal mass  Anemia  Hypophosphatemia    Discharge medications  Discharge Medication List as of 2/19/2024  5:54 PM      START taking these medications    Details   acetaminophen (TYLENOL) 325 MG tablet Take 2-3 tablets (650-975 mg) by mouth every 8 hours as needed for mild pain, Disp-50 tablet, R-0, E-Prescribe      methocarbamol (ROBAXIN) 500 MG tablet Take 1 tablet (500 mg) by mouth every 6 hours as needed for muscle spasms, Disp-20 tablet, R-0, E-Prescribe      oxyCODONE (ROXICODONE) 5 MG tablet Take 1-2 tablets (5-10 mg) by mouth every 4 hours as needed for moderate pain, Disp-30 tablet, R-0, E-Prescribe      polyethylene glycol (MIRALAX) 17 GM/Dose powder Take 17 g by mouth daily as needed for constipation, Disp-510 g, R-0, E-Prescribe         CONTINUE these medications which have NOT CHANGED    Details   clindamycin (CLEOCIN T) 1 % external solution Apply 5 mLs topically as neededHistorical      clonazePAM (KLONOPIN) 0.5 MG tablet Take 0.5 mg by mouth 2 times daily as needed for anxiety, Historical      ferrous sulfate (FEROSUL) 325 (65 Fe) MG tablet Take 1 tablet by mouth daily, Historical      folic acid (FOLVITE) 1 MG tablet Take 1 tablet by mouth daily, Historical      Lacosamide (VIMPAT) 100 MG TABS tablet Take 100 mg by mouth 2 times daily, Historical      levETIRAcetam (KEPPRA) 500 MG tablet Take 4 tablets by mouth 2 times daily, Historical      metoprolol tartrate (LOPRESSOR) 25 MG tablet Take 1 tablet by mouth 2 times daily, Historical       omeprazole (PRILOSEC) 20 MG DR capsule Take 20 mg by mouth, Historical      potassium chloride ER (KLOR-CON M) 20 MEQ CR tablet Take 20 mEq by mouth 2 times daily, Historical      sertraline (ZOLOFT) 25 MG tablet Take 1 tablet by mouth every evening, Historical      sodium chloride 1 GM tablet Take 1 g by mouth 2 times daily, Historical      torsemide (DEMADEX) 5 MG tablet Take 1 tablet by mouth 2 times daily, Historical      Vitamin D3 50 mcg (2000 units) tablet Take 1 tablet by mouth daily, Historical      cetirizine (ZYRTEC) 10 MG tablet Take 1 tablet by mouth every evening, Historical           Follow up, Special Instructions:  After Care     Future Labs/Procedures    Activity     Comments:    Your activity upon discharge: Frequent out of bed and ambulation recommended. No lifting more than 10-15 lbs for 6 weeks.    Diet     Comments:    Follow this diet upon discharge: regular diet    Discharge Instructions     Comments:    If your travel plans upon discharge include prolonged periods of sitting (a lengthy car or plane ride), it is highly beneficial to get up and walk at least once per hour to help prevent swelling and blood clots.     You may shower after operation, let warm soapy water run over incision and pat dry. Do not submerge, soak, or scrub incision.    Take incentive spirometer home for continued frequent use    If you are discharged with narcotics (oxycodone) please take them only as needed and do not operate a car or heavy machinery for 24 hours after taking them.  Narcotic pain medications like oxycodone are constipating. Please ensure that you are drinking adequate amounts of fluids (64 ounces). Take Miralax as needed for constipation.     Do not drive until you can with stand pressing the brake pedal quickly and fully without pain and you are not distracted by pain.     Call clinic [phone] for fever greater than 101.5, chills, red skin around incision, drainage from incision, increased  "swelling from the incision, bleeding from the incision that is not controlled with light pressure, inability to tolerate diet,new nausea/vomiting or other new/worsening symptoms. You can reach the nurse care coordinator  Tiffany Gustafson at [phone].   For after hours questions or concerns you can contact the on-call surgical oncology resident (nights and weekends [phone] and ask \"I would like to page the Surgical Oncology Resident on call.\"). In emergencies, call 911. If your problem is necessitating an ER visit our first preference is that you return to the [facility] ER if able.     Follow Up:  -Follow up in clinic with Dr Cope 3/8/24. You should be called to make an appointment within 3 business days. If you are not contacted, call [phone] to make an appointment.        Procedures:  2/14/24 Dr. Cope  Exploratory laparotomy and removal of 2 cm retroperitoneal mass.     Consultations:  OCCUPATIONAL THERAPY ADULT IP CONSULT  PHYSICAL THERAPY ADULT IP CONSULT  NURSING TO CONSULT FOR VASCULAR ACCESS CARE IP CONSULT  NURSING TO CONSULT FOR VASCULAR ACCESS CARE IP CONSULT  NURSING TO CONSULT FOR VASCULAR ACCESS CARE IP CONSULT    Brief HPI:  Erlinda Reyes is a 38 year old year old female with retroperitoneal mass.     Hospital Course:  The patient was admitted and underwent exploratory laparotomy and removal of 2 cm retroperitoneal mass with Dr. Cope 2/14/24. The patient tolerated the procedure well. The patient recovered well with no post-operative complications. Diet was advanced as bowel function returned and as tolerated. Anemia secondary to surgical blood loss and dilution was monitored and remained stable. Potassium and phosphorus were replaced. Prior to discharge pain was controlled with oral pain medication and the patient was able to ambulate and void without difficulty. The patient received appropriate education post operatively. On POD #5 the patient was discharged to home.    - " Available for pain is acetaminophen, robaxin, and oxycodone  - Regular diet  - Home medication resumed   - Follow up with Dr. Cope 3/8/24.     Surgical pathology  A.  SOFT TISSUE, RETROPERITONEAL MASS, EXCISION:  -Benign cyst with necroinflammatory debris.  -No polarizable particles observed.     B. LATERAL MARGIN OF RETROPERITONEAL MASS, EXCISION:  -Fragments of benign cyst wall and fibroadipose tissue with fat necrosis.  -See comment.    Raudel Oliver MD PGY3  General Surgery Resident

## 2024-02-19 NOTE — PLAN OF CARE
Goal Outcome Evaluation:  Plan of Care Reviewed With: patient  Overall Patient Progress: no change  Outcome Evaluation:     Assumed Cares 5803-0015:   POD5, tolerated regular diet but low intake. BM yesterday. Voiding adequately. K and phos replaced. Ambulated in halls w/ PT w/ four wheeled walker. Increased edema in BLE, extremities elevated. Possible discharge tomorrow.    VSS on RA.   Temp: 98.2  F (36.8  C)    BP: 127/62    Pulse: 88    Resp: 18    SpO2: 97 %    O2 Device: None (Room air)

## 2024-02-19 NOTE — PROGRESS NOTES
Surgical Oncology Progress Note    Interval History:  POD 5  Feels okay this morning. No nausea/emesis. Passing gas and stool. Voiding independently.     Physical Exam:   Temp:  [97.7  F (36.5  C)-98.2  F (36.8  C)] 98.2  F (36.8  C)  Pulse:  [78-89] 89  Resp:  [16-18] 18  BP: (108-132)/(48-68) 114/48  SpO2:  [94 %-98 %] 97 %  General: Alert, oriented, appears comfortable, NAD.  Respiratory: breathing non labored  Abdomen: Abdomen is soft, non-tender, non-distended. Incision is clean, dry and intact.     Data:   All laboratory and imaging data in the past 24 hours reviewed  I/O last 3 completed shifts:  In: 240 [P.O.:240]  Out: 750 [Urine:750]  Recent Labs   Lab Test 02/19/24  0542 02/18/24  0636 02/17/24  0602   WBC 4.4 4.7 5.4   HGB 7.9* 8.0* 7.6*   * 97* 83*      Recent Labs   Lab Test 02/19/24  0542 02/18/24  1744 02/18/24  0636 02/17/24  0602     --  139 140   POTASSIUM 3.5 4.7 3.1* 3.6   CHLORIDE 103  --  102 106   CO2 26  --  25 24   BUN 11.2  --  9.3 7.8   CR 0.54  --  0.58 0.54   ANIONGAP 8  --  12 10   TOMMY 8.3*  --  8.1* 7.8*   GLC 86  --  73 85      Recent Labs   Lab Test 02/16/24  0726   PROTTOTAL 5.3*   ALBUMIN 2.9*   BILITOTAL 0.3   ALKPHOS 71   AST 32   ALT 18     Assessment and Plan:     Erlinda Reyes is a 38 year old year old female with retroperitoneal mass.      POD 5 s/p exploratory laparotomy and removal of 2 cm retroperitoneal mass with Dr. Cope 2/14/24.     - Available for pain is scheduled acetaminophen, robaxin, prn oxycodone. Abdominal binder prn for comfort. Menthol patches.   - Incentive spirometry, out of bed and ambulation  - Regular diet  - Scheduled bowel regimen  - Strict I&O  - Home clonazepam, lacosamide, keppra, metroprolol, ptx, zoloft, sodium chloride, and torsemide resumed.   - Anemia secondary to surgical blood loss and dilution. Monitor.   - Hypophosphatemia: monitor and replace  - Hypokalemia: wnl now  - Lovenox for DVT prophylaxis.   - Ambulate, OOB  -  dispo: may be ready to discharge later today vs tomorrow pending pain control    Seen with chief resident who will discuss with staff    Raudel Oliver MD PGY3  General Surgery Resident

## 2024-02-20 ENCOUNTER — PATIENT OUTREACH (OUTPATIENT)
Dept: CARE COORDINATION | Facility: CLINIC | Age: 39
End: 2024-02-20
Payer: COMMERCIAL

## 2024-02-20 LAB
PATH REPORT.ADDENDUM SPEC: NORMAL
PATH REPORT.COMMENTS IMP SPEC: NORMAL
PATH REPORT.FINAL DX SPEC: NORMAL
PATH REPORT.GROSS SPEC: NORMAL
PATH REPORT.MICROSCOPIC SPEC OTHER STN: NORMAL
PATH REPORT.RELEVANT HX SPEC: NORMAL
PHOTO IMAGE: NORMAL

## 2024-02-20 NOTE — PROGRESS NOTES
Clinic Care Coordination Contact  [facility]: Post-Discharge Note  SITUATION                                                      Admission:    Admission Date: 02/14/24   Reason for Admission: Retroperitoneal mass  Discharge:   Discharge Date: 02/19/24  Discharge Diagnosis: Retroperitoneal mass    BACKGROUND                                                      The patient was admitted and underwent exploratory laparotomy and removal of 2 cm retroperitoneal mass with Dr. Cope 2/14/24. The patient tolerated the procedure well. The patient recovered well with no post-operative complications. Diet was advanced as bowel function returned and as tolerated. Anemia secondary to surgical blood loss and dilution was monitored and remained stable. Potassium and phosphorus were replaced. Prior to discharge pain was controlled with oral pain medication and the patient was able to ambulate and void without difficulty. The patient received appropriate education post operatively. On POD #5 the patient was discharged to home.     - Available for pain is acetaminophen, robaxin, and oxycodone  - Regular diet  - Home medication resumed   - Follow up with Dr. Cope 3/8/24.     ASSESSMENT           Discharge Assessment  How are you doing now that you are home?: Spoke with Mom who shares that patient is doing well. Has been sleeping quite a bit and is is taking pain meds every 4 hours. Hasn't eaten much, but is eating some. Mom asks when she should start to worry and writer suggests she call Care Coordinator listed in AVS if things get worst. Mom agrees with this plan. No other questions/concnerns or needs. Will see PCP next week  How are your symptoms? (Red Flag symptoms escalate to triage hotline per guidelines): Improved  Do you feel your condition is stable enough to be safe at home until your provider visit?: Yes  Does the patient have their discharge instructions? : Yes  Does the patient have questions regarding their  discharge instructions? : No  Were you started on any new medications or were there changes to any of your previous medications? : Yes  Does the patient have all of their medications?: Yes  Do you have questions regarding any of your medications? : No  Do you have all of your needed medical supplies or equipment (DME)?  (i.e. oxygen tank, CPAP, cane, etc.): Yes  Discharge follow-up appointment scheduled within 14 calendar days? : Yes  Discharge Follow Up Appointment Date: 02/28/24  Discharge Follow Up Appointment Scheduled with?: Primary Care Provider    Post-op ([facility] Only)  If the patient had a surgery or procedure, do they have any questions for a nurse?: No    Post-op (Clinicians Only)  Did the patient have surgery or a procedure: Yes  Incision: closed  Drainage: No  Fever: No  Chills: No  Redness: No  Warmth: No  Swelling: No  Incision site pain: Yes (Taking meds every 4 hours)  Eating & Drinking: eating and drinking without complaints/concerns (Some)  Additional Symptoms: decreased appetite      PLAN                                                      Outpatient Plan:  Follow Up:  -Follow up in clinic with Dr Cope 3/8/24. You should be called to make an appointment within 3 business days. If you are not contacted, call [phone] to make an appointment.    Future Appointments   Date Time Provider Department Center   3/8/2024  9:00 AM Agus Cope MD [facility]         For any urgent concerns, please contact our 24 hour nurse triage line: [phone] ([phone])         CHADD Rutledge

## 2024-02-20 NOTE — PLAN OF CARE
Goal Outcome Evaluation:       A&O VSS on RA. PIV removed for discharge. Pt discharged with all medications from discharge pharmacy and ride home with Mother. AVS given with information understood about care of abdominal incision and post op appointments. Pt and Mother had no further questions.

## 2024-02-20 NOTE — PLAN OF CARE
Physical Therapy Discharge Summary    Reason for therapy discharge:    Discharged to home with home therapy.    Progress towards therapy goal(s). See goals on Care Plan in [facility] electronic health record for goal details.  Goals partially met.  Barriers to achieving goals:   discharge from facility.    Therapy recommendation(s):    Continued therapy is recommended.  Rationale/Recommendations:  To improve functional mobility and strength for increased IND with functional activities.

## 2024-02-21 ENCOUNTER — PATIENT OUTREACH (OUTPATIENT)
Dept: ONCOLOGY | Facility: CLINIC | Age: 39
End: 2024-02-21
Payer: COMMERCIAL

## 2024-02-21 NOTE — CARE PLAN
Occupational Therapy Discharge Summary    Reason for therapy discharge:    Discharged to home with home therapy.    Progress towards therapy goal(s). See goals on Care Plan in [facility] electronic health record for goal details.  Goals partially met.  Barriers to achieving goals:   discharge from facility.    Therapy recommendation(s):    Continued therapy is recommended.  Rationale/Recommendations:  To maximize ADL IND and safety.

## 2024-02-21 NOTE — PROGRESS NOTES
[facility]: Surgical Oncology Post-op Call Note                                     Discussion with Patient                                                           Surgery:      Exploratory laparotomy and removal of 2 cm retroperitoneal mass.      Surgery date:  2/14/2024     Discharge Date:  2/19/2024    Date of Post-op Call:  2/21/2024       Left message for Erlinda's mother, Kimmy, following Erlinda's surgery. Encouraged Kimmy to call with any questions or concerns. Contact info provided. Reviewed follow up appointment scheduled on 3/8/2024 with Dr. Cope.       Post op/follow up plans:        Future Appointments   Date Time Provider Department Center   3/8/2024  9:00 AM Agus Cope MD [facility]         Tiffany Gustafson RNCC  [facility]  Surgical Oncology

## 2024-02-23 ENCOUNTER — PATIENT OUTREACH (OUTPATIENT)
Dept: ONCOLOGY | Facility: CLINIC | Age: 39
End: 2024-02-23
Payer: COMMERCIAL

## 2024-02-23 NOTE — PROGRESS NOTES
"[facility]: Surgical Oncology Cancer Care Short Note                                     Discussion with Patient:                                                       INBOUND CALL:     Received call from Erlinda Murillo's mother. She is requesting a refill of Erlinda needs a refill of her Oxycodone.     Lidia stated Erlinda is having intermittent pain in her back and abdomen. Pain in her abdomen was in the middle but has now \"moved to the left.\" She had been giving Erlinda 5-10 mg every 4-6 hours following surgery  on 2/14/2024. Lidia stated she tried to switch Lidia to just Tylenol last evening but it has not been helping and she woke up in the middle of the night last night with pain and was given an Oxycodone, which helped resolve the pain. Erlinda cannot take Advil d/t her kidney issues.     Lidia stated Erlinda is having regular bowel movements. She is taking Miralax daily. She has minimal PO intake, but is eating and drinking some each day. She is OOB daily and went out to a restaurant last evening with her family.     Discussed with Lidia we would not anticipate Erlinda needing Oxycodone nine days out from surgery and that no medication can take away all surgery related pain. Explained that Erlinda's back pain may be positional and encouraged her to continue with scheduled Tylenol and Robaxin and use heat and ice PRN. Encouraged activity daily.     Lidia verbalized understanding of the plan. Enoucarged her to call with any additional questions or concerns. Direct contact number provided. Provided number for Surgical Oncology Residents on-call for after hours and weekends.     Tiffayn Gustafson RNCC  [facility]   Surgical Oncology     Approximately 10 minutes was spent in conversation with the patient/caregiver.      "

## 2024-02-26 LAB — INTERPRETATION: NORMAL

## 2024-03-08 ENCOUNTER — ONCOLOGY VISIT (OUTPATIENT)
Dept: ONCOLOGY | Facility: CLINIC | Age: 39
End: 2024-03-08
Attending: SURGERY
Payer: COMMERCIAL

## 2024-03-08 VITALS
RESPIRATION RATE: 16 BRPM | WEIGHT: 142.9 LBS | OXYGEN SATURATION: 97 % | BODY MASS INDEX: 29.87 KG/M2 | TEMPERATURE: 97.4 F | HEART RATE: 82 BPM | DIASTOLIC BLOOD PRESSURE: 54 MMHG | SYSTOLIC BLOOD PRESSURE: 132 MMHG

## 2024-03-08 DIAGNOSIS — R19.00 RETROPERITONEAL MASS: Primary | ICD-10-CM

## 2024-03-08 ASSESSMENT — PAIN SCALES - GENERAL: PAINLEVEL: MODERATE PAIN (5)

## 2024-03-08 NOTE — PROGRESS NOTES
Aida is here for postoperative visit after undergoing a resection of a periduodenal mass on 214.  She has done well since her surgery with no postoperative complications.  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  On physical examination her incisions healing well with no evidence of infection.    Impression: Postop check    Plan: Asked patient to contact me if she has any problems related to her surgery otherwise she will not need any follow-up.  And dictation

## 2024-03-08 NOTE — NURSING NOTE
"Oncology Rooming Note    March 8, 2024 8:51 AM   Erlinda Reyes is a 38 year old female who presents for:    Chief Complaint   Patient presents with    Oncology Clinic Visit     Retroperitoneal mass     Initial Vitals: /54   Pulse 82   Temp 97.4  F (36.3  C) (Oral)   Resp 16   Wt 64.8 kg (142 lb 14.4 oz)   LMP  (LMP Unknown)   SpO2 97%   BMI 29.87 kg/m   Estimated body mass index is 29.87 kg/m  as calculated from the following:    Height as of 2/14/24: 1.473 m (4' 10\").    Weight as of this encounter: 64.8 kg (142 lb 14.4 oz). Body surface area is 1.63 meters squared.  Moderate Pain (5) Comment: Data Unavailable   No LMP recorded (lmp unknown).  Allergies reviewed: Yes  Medications reviewed: Yes    Medications: Medication refills not needed today.  Pharmacy name entered into [facility]: [facility] - [address]    Frailty Screening:   Is the patient here for a new oncology consult visit in cancer care? 2. No      Clinical concerns: None       Carrie Jones CMA            "

## 2024-03-08 NOTE — LETTER
3/8/2024         RE: Erlinda Reyes  [address] [facility] [address]        Dear Colleague,    Thank you for referring your patient, Erlinda Reyes, to the [facility]. Please see a copy of my visit note below.    Aida is here for postoperative visit after undergoing a resection of a periduodenal mass on 214.  She has done well since her surgery with no postoperative complications.  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  On physical examination her incisions healing well with no evidence of infection.    Impression: Postop check    Plan: Asked patient to contact me if she has any problems related to her surgery otherwise she will not need any follow-up.      Sincerely,        Agus Cope MD

## 2024-10-06 ENCOUNTER — HEALTH MAINTENANCE LETTER (OUTPATIENT)
Age: 39
End: 2024-10-06

## (undated) DEVICE — SU PDS II 4-0 FS-2 27" Z422H

## (undated) DEVICE — SU VICRYL 3-0 SH 27" J316H

## (undated) DEVICE — SU PROLENE 4-0 SHDA 36" 8521H

## (undated) DEVICE — SPONGE LAP 18X18" X8435

## (undated) DEVICE — SU SILK 0 TIE 6X30" A306H

## (undated) DEVICE — SUCTION TIP POOLE K770

## (undated) DEVICE — GLOVE BIOGEL PI ULTRATOUCH SZ 8.0 41180

## (undated) DEVICE — NEEDLE SPINAL DISP 18GA X 3.5" QUINCKE 333350

## (undated) DEVICE — DRAPE IOBAN INCISE 23X17" 6650EZ

## (undated) DEVICE — Device

## (undated) DEVICE — BLADE KNIFE SURG 15 371115

## (undated) DEVICE — ESU HARMONIC ACE LAP SHEARS STRYKER ACE+ 7 5MMX23CM HARH23

## (undated) DEVICE — SU DERMABOND ADVANCED .7ML DNX12

## (undated) DEVICE — CLIP HORIZON LG ORANGE 004200

## (undated) DEVICE — SUCTION MANIFOLD NEPTUNE 2 SYS 4 PORT 0702-020-000

## (undated) DEVICE — SU SILK 3-0 SH 30" K832H

## (undated) DEVICE — SU PDS II 2-0 SH 27" Z317H

## (undated) DEVICE — ESU PENCIL W/COATED BLADE E2450H

## (undated) DEVICE — SUCTION MINISQUAIR SMOKE EVAC CAPTURE DEVICE SQ20012-01

## (undated) DEVICE — SU VICRYL 3-0 SH 27" UND J416H

## (undated) DEVICE — CLIP HORIZON SM RED WIDE SLOT 001201

## (undated) DEVICE — SU SILK 3-0 TIE 12X30" A304H

## (undated) DEVICE — SPONGE KITTNER 30-101

## (undated) DEVICE — SOL NACL 0.9% IRRIG 1000ML BOTTLE 2F7124

## (undated) DEVICE — CLIP HORIZON MED BLUE 002200

## (undated) DEVICE — PREP CHLORAPREP 26ML TINTED HI-LITE ORANGE 930815

## (undated) DEVICE — SU SILK 2-0 TIE 12X30" A305H

## (undated) DEVICE — LINEN TOWEL PACK X30 5481

## (undated) DEVICE — LINEN TOWEL PACK X6 WHITE 5487

## (undated) DEVICE — TUBING SMOKE EVAC 2.2CMX3M SEA3715

## (undated) DEVICE — SU PDS II 1 CTX 36" Z371T

## (undated) DEVICE — SOL WATER IRRIG 1000ML BOTTLE 2F7114

## (undated) DEVICE — SURGICEL ABSORBABLE HEMOSTAT SNOW 4"X4" 2083

## (undated) RX ORDER — ACETAMINOPHEN 325 MG/1
TABLET ORAL
Status: DISPENSED
Start: 2024-02-14

## (undated) RX ORDER — FENTANYL CITRATE 50 UG/ML
INJECTION, SOLUTION INTRAMUSCULAR; INTRAVENOUS
Status: DISPENSED
Start: 2024-02-14

## (undated) RX ORDER — BUPIVACAINE HYDROCHLORIDE 2.5 MG/ML
INJECTION, SOLUTION EPIDURAL; INFILTRATION; INTRACAUDAL
Status: DISPENSED
Start: 2024-02-14

## (undated) RX ORDER — SODIUM CHLORIDE 9 MG/ML
INJECTION, SOLUTION INTRAVENOUS
Status: DISPENSED
Start: 2024-02-14

## (undated) RX ORDER — ONDANSETRON 2 MG/ML
INJECTION INTRAMUSCULAR; INTRAVENOUS
Status: DISPENSED
Start: 2024-02-14

## (undated) RX ORDER — EPHEDRINE SULFATE 50 MG/ML
INJECTION, SOLUTION INTRAMUSCULAR; INTRAVENOUS; SUBCUTANEOUS
Status: DISPENSED
Start: 2024-02-14

## (undated) RX ORDER — KETOROLAC TROMETHAMINE 30 MG/ML
INJECTION, SOLUTION INTRAMUSCULAR; INTRAVENOUS
Status: DISPENSED
Start: 2024-02-14

## (undated) RX ORDER — DEXAMETHASONE SODIUM PHOSPHATE 4 MG/ML
INJECTION, SOLUTION INTRA-ARTICULAR; INTRALESIONAL; INTRAMUSCULAR; INTRAVENOUS; SOFT TISSUE
Status: DISPENSED
Start: 2024-02-14

## (undated) RX ORDER — GLYCOPYRROLATE 0.2 MG/ML
INJECTION, SOLUTION INTRAMUSCULAR; INTRAVENOUS
Status: DISPENSED
Start: 2024-02-14

## (undated) RX ORDER — CEFAZOLIN SODIUM/WATER 2 G/20 ML
SYRINGE (ML) INTRAVENOUS
Status: DISPENSED
Start: 2024-02-14

## (undated) RX ORDER — PROPOFOL 10 MG/ML
INJECTION, EMULSION INTRAVENOUS
Status: DISPENSED
Start: 2024-02-14

## (undated) RX ORDER — FENTANYL CITRATE-0.9 % NACL/PF 10 MCG/ML
PLASTIC BAG, INJECTION (ML) INTRAVENOUS
Status: DISPENSED
Start: 2024-02-14

## (undated) RX ORDER — HYDROMORPHONE HYDROCHLORIDE 1 MG/ML
INJECTION, SOLUTION INTRAMUSCULAR; INTRAVENOUS; SUBCUTANEOUS
Status: DISPENSED
Start: 2024-02-14